# Patient Record
Sex: MALE | Race: ASIAN | Employment: FULL TIME | URBAN - METROPOLITAN AREA
[De-identification: names, ages, dates, MRNs, and addresses within clinical notes are randomized per-mention and may not be internally consistent; named-entity substitution may affect disease eponyms.]

---

## 2018-01-03 PROBLEM — K64.4 EXTERNAL HEMORRHOIDS WITHOUT COMPLICATION: Status: ACTIVE | Noted: 2018-01-03

## 2018-01-03 PROBLEM — E78.1 HYPERTRIGLYCERIDEMIA: Status: ACTIVE | Noted: 2018-01-03

## 2021-09-04 ENCOUNTER — APPOINTMENT (OUTPATIENT)
Dept: GENERAL RADIOLOGY | Age: 51
DRG: 554 | End: 2021-09-04
Attending: PHYSICIAN ASSISTANT
Payer: COMMERCIAL

## 2021-09-04 ENCOUNTER — HOSPITAL ENCOUNTER (INPATIENT)
Age: 51
LOS: 2 days | Discharge: HOME | DRG: 554 | End: 2021-09-07
Attending: EMERGENCY MEDICINE | Admitting: ORTHOPAEDIC SURGERY
Payer: COMMERCIAL

## 2021-09-04 DIAGNOSIS — M25.461 KNEE EFFUSION, RIGHT: ICD-10-CM

## 2021-09-04 DIAGNOSIS — M00.9 PYOGENIC ARTHRITIS OF RIGHT KNEE JOINT, DUE TO UNSPECIFIED ORGANISM (HCC): Primary | ICD-10-CM

## 2021-09-04 LAB
ALBUMIN SERPL-MCNC: 3.2 G/DL (ref 3.5–5)
ALBUMIN/GLOB SERPL: 0.9 {RATIO} (ref 1.2–3.5)
ALP SERPL-CCNC: 46 U/L (ref 50–136)
ALT SERPL-CCNC: 54 U/L (ref 12–65)
ANION GAP SERPL CALC-SCNC: 5 MMOL/L (ref 7–16)
AST SERPL-CCNC: 16 U/L (ref 15–37)
BASOPHILS # BLD: 0 K/UL (ref 0–0.2)
BASOPHILS NFR BLD: 0 % (ref 0–2)
BILIRUB SERPL-MCNC: 0.8 MG/DL (ref 0.2–1.1)
BUN SERPL-MCNC: 11 MG/DL (ref 6–23)
CALCIUM SERPL-MCNC: 8.3 MG/DL (ref 8.3–10.4)
CHLORIDE SERPL-SCNC: 103 MMOL/L (ref 98–107)
CO2 SERPL-SCNC: 30 MMOL/L (ref 21–32)
COVID-19 RAPID TEST, COVR: NOT DETECTED
CREAT SERPL-MCNC: 0.82 MG/DL (ref 0.8–1.5)
CRP SERPL-MCNC: 5.5 MG/DL (ref 0–0.9)
DIFFERENTIAL METHOD BLD: ABNORMAL
EOSINOPHIL # BLD: 0.1 K/UL (ref 0–0.8)
EOSINOPHIL NFR BLD: 1 % (ref 0.5–7.8)
ERYTHROCYTE [DISTWIDTH] IN BLOOD BY AUTOMATED COUNT: 12.5 % (ref 11.9–14.6)
GLOBULIN SER CALC-MCNC: 3.6 G/DL (ref 2.3–3.5)
GLUCOSE SERPL-MCNC: 143 MG/DL (ref 65–100)
HCT VFR BLD AUTO: 45 % (ref 41.1–50.3)
HGB BLD-MCNC: 15.5 G/DL (ref 13.6–17.2)
IMM GRANULOCYTES # BLD AUTO: 0.1 K/UL (ref 0–0.5)
IMM GRANULOCYTES NFR BLD AUTO: 1 % (ref 0–5)
LACTATE SERPL-SCNC: 1.3 MMOL/L (ref 0.4–2)
LYMPHOCYTES # BLD: 2.5 K/UL (ref 0.5–4.6)
LYMPHOCYTES NFR BLD: 18 % (ref 13–44)
MCH RBC QN AUTO: 32.2 PG (ref 26.1–32.9)
MCHC RBC AUTO-ENTMCNC: 34.4 G/DL (ref 31.4–35)
MCV RBC AUTO: 93.4 FL (ref 79.6–97.8)
MONOCYTES # BLD: 1.4 K/UL (ref 0.1–1.3)
MONOCYTES NFR BLD: 10 % (ref 4–12)
NEUTS SEG # BLD: 10.1 K/UL (ref 1.7–8.2)
NEUTS SEG NFR BLD: 71 % (ref 43–78)
NRBC # BLD: 0 K/UL (ref 0–0.2)
PLATELET # BLD AUTO: 212 K/UL (ref 150–450)
PMV BLD AUTO: 8.9 FL (ref 9.4–12.3)
POTASSIUM SERPL-SCNC: 3.6 MMOL/L (ref 3.5–5.1)
PROCALCITONIN SERPL-MCNC: <0.05 NG/ML
PROT SERPL-MCNC: 6.8 G/DL (ref 6.3–8.2)
RBC # BLD AUTO: 4.82 M/UL (ref 4.23–5.6)
SARS-COV-2, COV2: NORMAL
SODIUM SERPL-SCNC: 138 MMOL/L (ref 136–145)
SOURCE, COVRS: NORMAL
WBC # BLD AUTO: 14.2 K/UL (ref 4.3–11.1)

## 2021-09-04 PROCEDURE — 74011000250 HC RX REV CODE- 250: Performed by: EMERGENCY MEDICINE

## 2021-09-04 PROCEDURE — 86140 C-REACTIVE PROTEIN: CPT

## 2021-09-04 PROCEDURE — 74011250636 HC RX REV CODE- 250/636: Performed by: EMERGENCY MEDICINE

## 2021-09-04 PROCEDURE — 87635 SARS-COV-2 COVID-19 AMP PRB: CPT

## 2021-09-04 PROCEDURE — 89050 BODY FLUID CELL COUNT: CPT

## 2021-09-04 PROCEDURE — 87040 BLOOD CULTURE FOR BACTERIA: CPT

## 2021-09-04 PROCEDURE — 93005 ELECTROCARDIOGRAM TRACING: CPT | Performed by: PHYSICIAN ASSISTANT

## 2021-09-04 PROCEDURE — 96365 THER/PROPH/DIAG IV INF INIT: CPT

## 2021-09-04 PROCEDURE — U0005 INFEC AGEN DETEC AMPLI PROBE: HCPCS

## 2021-09-04 PROCEDURE — 96368 THER/DIAG CONCURRENT INF: CPT

## 2021-09-04 PROCEDURE — 80053 COMPREHEN METABOLIC PANEL: CPT

## 2021-09-04 PROCEDURE — 71045 X-RAY EXAM CHEST 1 VIEW: CPT

## 2021-09-04 PROCEDURE — 87070 CULTURE OTHR SPECIMN AEROBIC: CPT

## 2021-09-04 PROCEDURE — 84145 PROCALCITONIN (PCT): CPT

## 2021-09-04 PROCEDURE — 73562 X-RAY EXAM OF KNEE 3: CPT

## 2021-09-04 PROCEDURE — 82945 GLUCOSE OTHER FLUID: CPT

## 2021-09-04 PROCEDURE — 89060 EXAM SYNOVIAL FLUID CRYSTALS: CPT

## 2021-09-04 PROCEDURE — 83605 ASSAY OF LACTIC ACID: CPT

## 2021-09-04 PROCEDURE — 85025 COMPLETE CBC W/AUTO DIFF WBC: CPT

## 2021-09-04 PROCEDURE — 99284 EMERGENCY DEPT VISIT MOD MDM: CPT

## 2021-09-04 PROCEDURE — 81003 URINALYSIS AUTO W/O SCOPE: CPT

## 2021-09-04 PROCEDURE — 74011250637 HC RX REV CODE- 250/637: Performed by: PHYSICIAN ASSISTANT

## 2021-09-04 PROCEDURE — 96366 THER/PROPH/DIAG IV INF ADDON: CPT

## 2021-09-04 PROCEDURE — 96375 TX/PRO/DX INJ NEW DRUG ADDON: CPT

## 2021-09-04 PROCEDURE — 74011250636 HC RX REV CODE- 250/636: Performed by: PHYSICIAN ASSISTANT

## 2021-09-04 PROCEDURE — 84157 ASSAY OF PROTEIN OTHER: CPT

## 2021-09-04 PROCEDURE — 75810000054 HC ARTHOCENTISIS JOINT

## 2021-09-04 PROCEDURE — 74011000258 HC RX REV CODE- 258: Performed by: EMERGENCY MEDICINE

## 2021-09-04 RX ORDER — ACETAMINOPHEN 500 MG
1000 TABLET ORAL
Status: COMPLETED | OUTPATIENT
Start: 2021-09-04 | End: 2021-09-04

## 2021-09-04 RX ORDER — HYDROMORPHONE HYDROCHLORIDE 1 MG/ML
1 INJECTION, SOLUTION INTRAMUSCULAR; INTRAVENOUS; SUBCUTANEOUS ONCE
Status: COMPLETED | OUTPATIENT
Start: 2021-09-04 | End: 2021-09-05

## 2021-09-04 RX ORDER — HYDROMORPHONE HYDROCHLORIDE 1 MG/ML
1 INJECTION, SOLUTION INTRAMUSCULAR; INTRAVENOUS; SUBCUTANEOUS ONCE
Status: COMPLETED | OUTPATIENT
Start: 2021-09-04 | End: 2021-09-04

## 2021-09-04 RX ORDER — ONDANSETRON 2 MG/ML
4 INJECTION INTRAMUSCULAR; INTRAVENOUS
Status: COMPLETED | OUTPATIENT
Start: 2021-09-04 | End: 2021-09-04

## 2021-09-04 RX ORDER — LIDOCAINE HYDROCHLORIDE 10 MG/ML
10 INJECTION INFILTRATION; PERINEURAL
Status: COMPLETED | OUTPATIENT
Start: 2021-09-04 | End: 2021-09-04

## 2021-09-04 RX ORDER — ONDANSETRON 2 MG/ML
4 INJECTION INTRAMUSCULAR; INTRAVENOUS
Status: COMPLETED | OUTPATIENT
Start: 2021-09-04 | End: 2021-09-05

## 2021-09-04 RX ORDER — SODIUM CHLORIDE 0.9 % (FLUSH) 0.9 %
5-10 SYRINGE (ML) INJECTION AS NEEDED
Status: DISCONTINUED | OUTPATIENT
Start: 2021-09-04 | End: 2021-09-07 | Stop reason: HOSPADM

## 2021-09-04 RX ORDER — SODIUM CHLORIDE 0.9 % (FLUSH) 0.9 %
5-10 SYRINGE (ML) INJECTION EVERY 8 HOURS
Status: DISCONTINUED | OUTPATIENT
Start: 2021-09-04 | End: 2021-09-07 | Stop reason: HOSPADM

## 2021-09-04 RX ORDER — VANCOMYCIN 1.75 GRAM/500 ML IN 0.9 % SODIUM CHLORIDE INTRAVENOUS
1750 ONCE
Status: COMPLETED | OUTPATIENT
Start: 2021-09-04 | End: 2021-09-04

## 2021-09-04 RX ORDER — BUPIVACAINE HYDROCHLORIDE 5 MG/ML
2 INJECTION, SOLUTION EPIDURAL; INTRACAUDAL ONCE
Status: COMPLETED | OUTPATIENT
Start: 2021-09-04 | End: 2021-09-04

## 2021-09-04 RX ADMIN — HYDROMORPHONE HYDROCHLORIDE 1 MG: 1 INJECTION, SOLUTION INTRAMUSCULAR; INTRAVENOUS; SUBCUTANEOUS at 18:43

## 2021-09-04 RX ADMIN — SODIUM CHLORIDE, SODIUM LACTATE, POTASSIUM CHLORIDE, AND CALCIUM CHLORIDE 1000 ML: 600; 310; 30; 20 INJECTION, SOLUTION INTRAVENOUS at 18:44

## 2021-09-04 RX ADMIN — PIPERACILLIN AND TAZOBACTAM 4.5 G: 4; .5 INJECTION, POWDER, FOR SOLUTION INTRAVENOUS at 18:58

## 2021-09-04 RX ADMIN — LIDOCAINE HYDROCHLORIDE 10 ML: 10 INJECTION, SOLUTION INFILTRATION; PERINEURAL at 20:14

## 2021-09-04 RX ADMIN — ONDANSETRON 4 MG: 2 INJECTION INTRAMUSCULAR; INTRAVENOUS at 18:43

## 2021-09-04 RX ADMIN — ACETAMINOPHEN 1000 MG: 500 TABLET, FILM COATED ORAL at 18:43

## 2021-09-04 RX ADMIN — VANCOMYCIN HYDROCHLORIDE 1750 MG: 10 INJECTION, POWDER, LYOPHILIZED, FOR SOLUTION INTRAVENOUS at 20:00

## 2021-09-04 RX ADMIN — BUPIVACAINE HYDROCHLORIDE 10 MG: 5 INJECTION, SOLUTION EPIDURAL; INTRACAUDAL at 20:14

## 2021-09-04 NOTE — ED NOTES
Patient with swollen right red knee for two weeks progressively getting worse. Fever today in triage 102.9. No N/V. H/0 gout. +Tenderness to palpation over right knee with redness and swelling. Patient evaluated initially in triage. Rapid Medical Evaluation was conducted and necessary orders have been placed. I have performed a medical screening exam.  Care will now be transferred to the attending physician in the emergency department.   GARY Gongora 4:44 PM

## 2021-09-04 NOTE — ED TRIAGE NOTES
Here for \"gout\" in right knee. Hx of gout and right knee is swollen. Fever present 102.9 in triage. Masked. Denies COVID exposure.

## 2021-09-05 PROBLEM — M25.461 KNEE EFFUSION, RIGHT: Status: ACTIVE | Noted: 2021-09-05

## 2021-09-05 LAB
APPEARANCE FLD: NORMAL
ATRIAL RATE: 98 BPM
CALCULATED P AXIS, ECG09: 2 DEGREES
CALCULATED R AXIS, ECG10: 8 DEGREES
CALCULATED T AXIS, ECG11: -39 DEGREES
COLOR FLD: YELLOW
DIAGNOSIS, 93000: NORMAL
GLUCOSE FLD-MCNC: 3 MG/DL
LYMPHOCYTES NFR BRONCH MANUAL: 1 %
NEUTROPHILS NFR BRONCH MANUAL: 99 %
NUC CELL # FLD: NORMAL /CU MM
P-R INTERVAL, ECG05: 156 MS
PROT FLD-MCNC: 2.9 G/DL
Q-T INTERVAL, ECG07: 322 MS
QRS DURATION, ECG06: 84 MS
QTC CALCULATION (BEZET), ECG08: 411 MS
RBC # FLD: 1000 /CU MM
SPECIMEN SOURCE FLD: NORMAL
VENTRICULAR RATE, ECG03: 98 BPM

## 2021-09-05 PROCEDURE — 99233 SBSQ HOSP IP/OBS HIGH 50: CPT | Performed by: PHYSICIAN ASSISTANT

## 2021-09-05 PROCEDURE — 65270000029 HC RM PRIVATE

## 2021-09-05 PROCEDURE — 74011000258 HC RX REV CODE- 258: Performed by: HOSPITALIST

## 2021-09-05 PROCEDURE — 2709999900 HC NON-CHARGEABLE SUPPLY

## 2021-09-05 PROCEDURE — 74011250636 HC RX REV CODE- 250/636: Performed by: EMERGENCY MEDICINE

## 2021-09-05 PROCEDURE — 77030040361 HC SLV COMPR DVT MDII -B

## 2021-09-05 PROCEDURE — 74011250636 HC RX REV CODE- 250/636: Performed by: PHYSICIAN ASSISTANT

## 2021-09-05 PROCEDURE — 74011250637 HC RX REV CODE- 250/637: Performed by: HOSPITALIST

## 2021-09-05 PROCEDURE — 74011250636 HC RX REV CODE- 250/636: Performed by: HOSPITALIST

## 2021-09-05 PROCEDURE — 96376 TX/PRO/DX INJ SAME DRUG ADON: CPT

## 2021-09-05 PROCEDURE — 74011250637 HC RX REV CODE- 250/637: Performed by: PHYSICIAN ASSISTANT

## 2021-09-05 RX ORDER — HYDROMORPHONE HYDROCHLORIDE 1 MG/ML
1 INJECTION, SOLUTION INTRAMUSCULAR; INTRAVENOUS; SUBCUTANEOUS
Status: DISCONTINUED | OUTPATIENT
Start: 2021-09-05 | End: 2021-09-07 | Stop reason: HOSPADM

## 2021-09-05 RX ORDER — OXYCODONE HYDROCHLORIDE 5 MG/1
5-10 TABLET ORAL
Status: DISCONTINUED | OUTPATIENT
Start: 2021-09-05 | End: 2021-09-07

## 2021-09-05 RX ORDER — ASPIRIN 81 MG/1
81 TABLET ORAL EVERY 12 HOURS
Status: DISCONTINUED | OUTPATIENT
Start: 2021-09-05 | End: 2021-09-07 | Stop reason: HOSPADM

## 2021-09-05 RX ORDER — COLCHICINE 0.6 MG/1
1.2 TABLET ORAL ONCE
Status: COMPLETED | OUTPATIENT
Start: 2021-09-05 | End: 2021-09-05

## 2021-09-05 RX ORDER — ACETAMINOPHEN 500 MG
1000 TABLET ORAL
Status: DISCONTINUED | OUTPATIENT
Start: 2021-09-06 | End: 2021-09-05

## 2021-09-05 RX ORDER — AMLODIPINE BESYLATE 5 MG/1
5 TABLET ORAL DAILY
Status: DISCONTINUED | OUTPATIENT
Start: 2021-09-05 | End: 2021-09-07 | Stop reason: HOSPADM

## 2021-09-05 RX ORDER — NALOXONE HYDROCHLORIDE 0.4 MG/ML
.2-.4 INJECTION, SOLUTION INTRAMUSCULAR; INTRAVENOUS; SUBCUTANEOUS
Status: DISCONTINUED | OUTPATIENT
Start: 2021-09-05 | End: 2021-09-07 | Stop reason: HOSPADM

## 2021-09-05 RX ORDER — ACETAMINOPHEN 500 MG
1000 TABLET ORAL
Status: DISCONTINUED | OUTPATIENT
Start: 2021-09-05 | End: 2021-09-07 | Stop reason: ALTCHOICE

## 2021-09-05 RX ORDER — SODIUM CHLORIDE 9 MG/ML
100 INJECTION, SOLUTION INTRAVENOUS CONTINUOUS
Status: DISPENSED | OUTPATIENT
Start: 2021-09-05 | End: 2021-09-06

## 2021-09-05 RX ORDER — HYDRALAZINE HYDROCHLORIDE 50 MG/1
50 TABLET, FILM COATED ORAL
Status: DISCONTINUED | OUTPATIENT
Start: 2021-09-05 | End: 2021-09-07 | Stop reason: HOSPADM

## 2021-09-05 RX ORDER — VANCOMYCIN HYDROCHLORIDE
1250 EVERY 12 HOURS
Status: DISCONTINUED | OUTPATIENT
Start: 2021-09-05 | End: 2021-09-06 | Stop reason: DRUGHIGH

## 2021-09-05 RX ORDER — DIPHENHYDRAMINE HCL 25 MG
25 CAPSULE ORAL
Status: DISCONTINUED | OUTPATIENT
Start: 2021-09-05 | End: 2021-09-07 | Stop reason: HOSPADM

## 2021-09-05 RX ORDER — SODIUM CHLORIDE 0.9 % (FLUSH) 0.9 %
5-40 SYRINGE (ML) INJECTION AS NEEDED
Status: DISCONTINUED | OUTPATIENT
Start: 2021-09-05 | End: 2021-09-07 | Stop reason: HOSPADM

## 2021-09-05 RX ORDER — SODIUM CHLORIDE 0.9 % (FLUSH) 0.9 %
5-40 SYRINGE (ML) INJECTION EVERY 8 HOURS
Status: DISCONTINUED | OUTPATIENT
Start: 2021-09-05 | End: 2021-09-07 | Stop reason: HOSPADM

## 2021-09-05 RX ORDER — ONDANSETRON 8 MG/1
8 TABLET, ORALLY DISINTEGRATING ORAL
Status: DISCONTINUED | OUTPATIENT
Start: 2021-09-05 | End: 2021-09-07 | Stop reason: HOSPADM

## 2021-09-05 RX ORDER — AMOXICILLIN 250 MG
2 CAPSULE ORAL DAILY
Status: DISCONTINUED | OUTPATIENT
Start: 2021-09-06 | End: 2021-09-07 | Stop reason: HOSPADM

## 2021-09-05 RX ORDER — PROMETHAZINE HYDROCHLORIDE 25 MG/1
25 TABLET ORAL
Status: DISCONTINUED | OUTPATIENT
Start: 2021-09-05 | End: 2021-09-07 | Stop reason: HOSPADM

## 2021-09-05 RX ORDER — COLCHICINE 0.6 MG/1
0.6 TABLET ORAL DAILY
Status: DISCONTINUED | OUTPATIENT
Start: 2021-09-05 | End: 2021-09-07 | Stop reason: HOSPADM

## 2021-09-05 RX ADMIN — OXYCODONE 10 MG: 5 TABLET ORAL at 09:21

## 2021-09-05 RX ADMIN — COLCHICINE 1.2 MG: 0.6 TABLET, FILM COATED ORAL at 13:58

## 2021-09-05 RX ADMIN — Medication 10 ML: at 01:46

## 2021-09-05 RX ADMIN — OXYCODONE 10 MG: 5 TABLET ORAL at 22:46

## 2021-09-05 RX ADMIN — VANCOMYCIN HYDROCHLORIDE 1250 MG: 10 INJECTION, POWDER, LYOPHILIZED, FOR SOLUTION INTRAVENOUS at 15:37

## 2021-09-05 RX ADMIN — HYDRALAZINE HYDROCHLORIDE 50 MG: 50 TABLET, FILM COATED ORAL at 13:08

## 2021-09-05 RX ADMIN — COLCHICINE 0.6 MG: 0.6 TABLET, FILM COATED ORAL at 15:41

## 2021-09-05 RX ADMIN — HYDROMORPHONE HYDROCHLORIDE 1 MG: 1 INJECTION, SOLUTION INTRAMUSCULAR; INTRAVENOUS; SUBCUTANEOUS at 02:03

## 2021-09-05 RX ADMIN — ONDANSETRON 4 MG: 2 INJECTION INTRAMUSCULAR; INTRAVENOUS at 00:29

## 2021-09-05 RX ADMIN — CEFTRIAXONE SODIUM 2 G: 2 INJECTION, POWDER, FOR SOLUTION INTRAMUSCULAR; INTRAVENOUS at 13:59

## 2021-09-05 RX ADMIN — Medication 10 ML: at 23:10

## 2021-09-05 RX ADMIN — SODIUM CHLORIDE 100 ML/HR: 900 INJECTION, SOLUTION INTRAVENOUS at 01:46

## 2021-09-05 RX ADMIN — SODIUM CHLORIDE 100 ML/HR: 900 INJECTION, SOLUTION INTRAVENOUS at 12:11

## 2021-09-05 RX ADMIN — HYDROMORPHONE HYDROCHLORIDE 1 MG: 1 INJECTION, SOLUTION INTRAMUSCULAR; INTRAVENOUS; SUBCUTANEOUS at 00:29

## 2021-09-05 RX ADMIN — ACETAMINOPHEN 1000 MG: 500 TABLET, FILM COATED ORAL at 13:08

## 2021-09-05 RX ADMIN — ACETAMINOPHEN 1000 MG: 500 TABLET, FILM COATED ORAL at 22:48

## 2021-09-05 RX ADMIN — Medication 81 MG: at 22:46

## 2021-09-05 RX ADMIN — OXYCODONE 10 MG: 5 TABLET ORAL at 13:13

## 2021-09-05 RX ADMIN — HYDROMORPHONE HYDROCHLORIDE 1 MG: 1 INJECTION, SOLUTION INTRAMUSCULAR; INTRAVENOUS; SUBCUTANEOUS at 05:12

## 2021-09-05 RX ADMIN — HYDROMORPHONE HYDROCHLORIDE 1 MG: 1 INJECTION, SOLUTION INTRAMUSCULAR; INTRAVENOUS; SUBCUTANEOUS at 19:37

## 2021-09-05 RX ADMIN — AMLODIPINE BESYLATE 5 MG: 5 TABLET ORAL at 09:20

## 2021-09-05 NOTE — ED NOTES
TRANSFER - OUT REPORT:    Verbal report given to BLAYNE Avila on Mark Gutiérrez  being transferred to Room: 328 for routine progression of care       Report consisted of patients Situation, Background, Assessment and   Recommendations(SBAR). Information from the following report(s) SBAR was reviewed with the receiving nurse. Lines:   Peripheral IV 09/04/21 Right Antecubital (Active)   Site Assessment Clean, dry, & intact 09/04/21 1650   Phlebitis Assessment 0 09/04/21 1650   Infiltration Assessment 0 09/04/21 1650   Dressing Status Clean, dry, & intact 09/04/21 1650   Dressing Type Gauze;Tape;Transparent 09/04/21 1650   Hub Color/Line Status Pink;Flushed 09/04/21 1650   Action Taken Blood drawn 09/04/21 1650        Opportunity for questions and clarification was provided.       Patient transported with:   Registered Nurse

## 2021-09-05 NOTE — CONSULTS
[facility] Hospitalist Consult   Admit Date:  2021  6:04 PM   Name:  Basil Bustillo   Age:  49 y.o. Sex:  male  :  1970   MRN:  [id number]     Presenting Complaint: Fever and Knee Pain    Reason(s) for Admission: Knee effusion, right [M25.461]     Hospitalists consulted by Lashanda Marcos MD for: Hypertension    History of Presenting Illness:   Basil Bustillo is a 49 y.o. male with history of hypertension who was admitted for right knee swelling. Patient has right knee swelling that has been progressively worsening over the past week. Fever on admission. He had arthrocentesis done on admission. Started on  vancomycin pending culture results. Orthopedics primary. Hospitalist consulted for management of hypertension. This morning patient states that he has worsening pain in his right knee with swelling. Pain in his right knee 10/10 in severity, sharp, restricted range of motion due to pain. No alleviating factors. Aggravated by movement. No injury to the right knee. He reports he had gouty arthritis in his right knee about 10 years ago. No chest pain no palpitations. No cough no shortness of breath. No nausea no vomiting. Review of Systems:  10 systems reviewed and negative except as noted in HPI. Assessment & Plan: Active Problems:      Knee effusion, right (2021)  ? Gouty arthritis versus septic arthritis. Per primary orthopedics. Continue vancomycin. Add ceftriaxone. Add colchicine. Await culture results from the right knee. Hypertension  Add amlodipine 5 mg p.o. daily. Hydralazine as needed. DVT prophylaxis SCDs  GI prophylaxis none    Disposition per primary orthopedics.     Active Hospital Problems    Diagnosis Date Noted    Knee effusion, right 2021       Past Medical History:   Diagnosis Date    Excessive daytime sleepiness 12/15/2016    Hemorrhoid     Hypercholesterolemia     Hypertension     Morbid obesity ([address]     Snoring 12/15/2016      No past surgical history on file. No Known Allergies   Social History     Tobacco Use    Smoking status: Former Smoker    Smokeless tobacco: Never Used   Substance Use Topics    Alcohol use: Yes     Comment: occasional drinker      Family History   Problem Relation Age of Onset    Hypertension Mother     Sleep Apnea Other     Gout Father       Family history reviewed and negative unless otherwise noted above. There is no immunization history on file for this patient. Objective:     Patient Vitals for the past 24 hrs:   Temp Pulse Resp BP SpO2   09/05/21 0923 99.3 °F (37.4 °C)       09/05/21 0813 99 °F (37.2 °C) 100 18 (!) 162/91 93 %   09/05/21 0130 99.9 °F (37.7 °C) 99 18 (!) 179/99 95 %   09/04/21 2200  86 16 (!) 159/96 97 %   09/04/21 2051  97 15 (!) 164/92 97 %   09/04/21 2050  94 24 (!) 164/92 95 %   09/04/21 2021 99.5 °F (37.5 °C)       09/04/21 1642 (!) 102.9 °F (39.4 °C) (!) 112 20 (!) 179/101 95 %     Oxygen Therapy  O2 Sat (%): 93 % (09/05/21 0813)  Pulse via Oximetry: 88 beats per minute (09/04/21 2200)  O2 Device: None (Room air) (09/04/21 2050)    Estimated body mass index is 25.63 kg/m² as calculated from the following:    Height as of this encounter: 5' 5\" (1.651 m). Weight as of this encounter: 69.9 kg (154 lb). Intake/Output Summary (Last 24 hours) at 9/5/2021 1300  Last data filed at 9/4/2021 2205  Gross per 24 hour   Intake 1600 ml   Output    Net 1600 ml         Physical Exam:  General:    Well nourished. No overt distress  Head:  Normocephalic, atraumatic  Eyes:  Sclerae appear normal.  Pupils equally round. ENT:  Nares appear normal, no drainage. Moist oral mucosa  Neck:  No restricted ROM. Trachea midline   CV:   RRR. No m/r/g. No jugular venous distension. Lungs:   CTAB. No wheezing, rhonchi, or rales. Respirations even, unlabored  Abdomen: Bowel sounds present. Soft, nontender, nondistended. Extremities: No cyanosis or clubbing.  Swelling, right knee, decreased range of motion, tenderness,  Skin:     No rashes and normal coloration. Warm and dry. Neuro:  Cranial nerves II-XII grossly intact. Sensation intact  Psych:  Normal mood and affect. Alert and oriented x3    I have reviewed ordered lab tests and independently visualized imaging below:    Last 24hr Labs:  Recent Results (from the past 24 hour(s))   CULTURE, BLOOD    Collection Time: 09/04/21  4:53 PM    Specimen: Blood   Result Value Ref Range    Special Requests: RIGHT  FOREARM        Culture result: NO GROWTH AFTER 13 HOURS     LACTIC ACID    Collection Time: 09/04/21  4:53 PM   Result Value Ref Range    Lactic acid 1.3 0.4 - 2.0 MMOL/L   CBC WITH AUTOMATED DIFF    Collection Time: 09/04/21  4:53 PM   Result Value Ref Range    WBC 14.2 (H) 4.3 - 11.1 K/uL    RBC 4.82 4.23 - 5.6 M/uL    HGB 15.5 13.6 - 17.2 g/dL    HCT 45.0 41.1 - 50.3 %    MCV 93.4 79.6 - 97.8 FL    MCH 32.2 26.1 - 32.9 PG    MCHC 34.4 31.4 - 35.0 g/dL    RDW 12.5 11.9 - 14.6 %    PLATELET 657 697 - 120 K/uL    MPV 8.9 (L) 9.4 - 12.3 FL    ABSOLUTE NRBC 0.00 0.0 - 0.2 K/uL    DF AUTOMATED      NEUTROPHILS 71 43 - 78 %    LYMPHOCYTES 18 13 - 44 %    MONOCYTES 10 4.0 - 12.0 %    EOSINOPHILS 1 0.5 - 7.8 %    BASOPHILS 0 0.0 - 2.0 %    IMMATURE GRANULOCYTES 1 0.0 - 5.0 %    ABS. NEUTROPHILS 10.1 (H) 1.7 - 8.2 K/UL    ABS. LYMPHOCYTES 2.5 0.5 - 4.6 K/UL    ABS. MONOCYTES 1.4 (H) 0.1 - 1.3 K/UL    ABS. EOSINOPHILS 0.1 0.0 - 0.8 K/UL    ABS. BASOPHILS 0.0 0.0 - 0.2 K/UL    ABS. IMM.  GRANS. 0.1 0.0 - 0.5 K/UL   METABOLIC PANEL, COMPREHENSIVE    Collection Time: 09/04/21  4:53 PM   Result Value Ref Range    Sodium 138 136 - 145 mmol/L    Potassium 3.6 3.5 - 5.1 mmol/L    Chloride 103 98 - 107 mmol/L    CO2 30 21 - 32 mmol/L    Anion gap 5 (L) 7 - 16 mmol/L    Glucose 143 (H) 65 - 100 mg/dL    BUN 11 6 - 23 MG/DL    Creatinine 0.82 0.8 - 1.5 MG/DL    GFR est AA >60 >60 ml/min/1.73m2    GFR est non-AA >60 >60 ml/min/1.73m2    Calcium 8.3 8.3 - 10.4 MG/DL    Bilirubin, total 0.8 0.2 - 1.1 MG/DL    ALT (SGPT) 54 12 - 65 U/L    AST (SGOT) 16 15 - 37 U/L    Alk. phosphatase 46 (L) 50 - 136 U/L    Protein, total 6.8 6.3 - 8.2 g/dL    Albumin 3.2 (L) 3.5 - 5.0 g/dL    Globulin 3.6 (H) 2.3 - 3.5 g/dL    A-G Ratio 0.9 (L) 1.2 - 3.5     PROCALCITONIN    Collection Time: 09/04/21  4:53 PM   Result Value Ref Range    Procalcitonin <0.05 ng/mL   C REACTIVE PROTEIN, QT    Collection Time: 09/04/21  4:53 PM   Result Value Ref Range    C-Reactive protein 5.5 (H) 0.0 - 0.9 mg/dL   EKG, 12 LEAD, INITIAL    Collection Time: 09/04/21  4:58 PM   Result Value Ref Range    Ventricular Rate 98 BPM    Atrial Rate 98 BPM    P-R Interval 156 ms    QRS Duration 84 ms    Q-T Interval 322 ms    QTC Calculation (Bezet) 411 ms    Calculated P Axis 2 degrees    Calculated R Axis 8 degrees    Calculated T Axis -39 degrees    Diagnosis       Normal sinus rhythm  T wave abnormality, consider inferolateral ischemia  Abnormal ECG  No previous ECGs available     CULTURE, BLOOD    Collection Time: 09/04/21  6:51 PM    Specimen: Blood   Result Value Ref Range    Special Requests: LEFT  Antecubital        Culture result: NO GROWTH AFTER 11 HOURS     COVID-19 RAPID TEST    Collection Time: 09/04/21  6:54 PM   Result Value Ref Range    Specimen source Nasopharyngeal      COVID-19 rapid test Not detected NOTD     SARS-COV-2    Collection Time: 09/04/21  6:54 PM   Result Value Ref Range    SARS-CoV-2 Please find results under separate order     PROTEIN TOTAL, FLUID    Collection Time: 09/04/21  6:55 PM   Result Value Ref Range    Fluid Type: SYNOVIAL FLUID      Protein total, body fld. 2.9 g/dL   CRYSTALS, SYNOVIAL FLUID    Collection Time: 09/04/21  6:55 PM   Result Value Ref Range    FLUID TYPE(7) SYNOVIAL FLUID      Crystals, body fluid PENDING    GLUCOSE, FLUID    Collection Time: 09/04/21  6:55 PM   Result Value Ref Range    Fluid Type: SYNOVIAL FLUID      Glucose, body fld.  3 MG/DL   CELL COUNT, BODY FLUID    Collection Time: 09/04/21  6:55 PM   Result Value Ref Range    BODY FLUID TYPE SYNOVIAL FLUID      FLUID COLOR YELLOW      FLUID APPEARANCE TURBID      FLUID RBC CT. 1,000 /cu mm    FLUID WBC COUNT 28,959 /cu mm    BRCH NEUTROPHIL 99 %    BRCH LYMPHS 1 %   CULTURE, BODY FLUID W GRAM STAIN    Collection Time: 09/04/21  6:55 PM    Specimen: Synovial Fluid    RIGHT   Result Value Ref Range    Special Requests: SYNOVIAL FLUID      GRAM STAIN PENDING     Culture result:        NO GROWTH AFTER SHORT PERIOD OF INCUBATION. FURTHER RESULTS TO FOLLOW AFTER OVERNIGHT INCUBATION. All Micro Results     Procedure Component Value Units Date/Time    CULTURE, BODY FLUID Marge Little STAIN [[id number]] Collected: 09/04/21 1855    Order Status: Completed Specimen: Synovial Fluid Updated: 09/05/21 0708     Special Requests: SYNOVIAL FLUID        GRAM STAIN PENDING     Culture result:       NO GROWTH AFTER SHORT PERIOD OF INCUBATION. FURTHER RESULTS TO FOLLOW AFTER OVERNIGHT INCUBATION. BLOOD CULTURE [[id number]] Collected: 09/04/21 1653    Order Status: Completed Specimen: Blood Updated: 09/05/21 0649     Special Requests: --        RIGHT  FOREARM       Culture result: NO GROWTH AFTER 13 HOURS       BLOOD CULTURE [[id number]] Collected: 09/04/21 1851    Order Status: Completed Specimen: Blood Updated: 09/05/21 0649     Special Requests: --        LEFT  Antecubital       Culture result: NO GROWTH AFTER 11 HOURS       COVID-19 RAPID TEST [[id number]] Collected: 09/04/21 1854    Order Status: Completed Specimen: Nasopharyngeal Updated: 09/04/21 1927     Specimen source Nasopharyngeal        COVID-19 rapid test Not detected        Comment:      The specimen is NEGATIVE for SARS-CoV-2, the novel coronavirus associated with COVID-19. A negative result does not rule out COVID-19. This test has been authorized by the FDA under an Emergency Use Authorization (EUA) for use by authorized laboratories.         Fact sheet for Healthcare Providers: kstattoo.com  Fact sheet for Patients: kstattoo.com       Methodology: Isothermal Nucleic Acid Amplification               Other Studies:  XR CHEST PORT    Result Date: 9/4/2021  Chest X-ray INDICATION: Fever A portable AP view of the chest was obtained. FINDINGS: The lungs are clear. There are no infiltrates or effusions. The heart size is normal.  The bony thorax is intact. No acute findings in the chest     XR KNEE RT 3 V    Result Date: 9/4/2021  Right Knee INDICATION: Pain and fever, history of gout Three views of the right knee were obtained FINDINGS:  There is a bipartite patella. There is no fracture or bone erosion. There is a large joint effusion.      Large joint effusion       Current Meds:  Current Facility-Administered Medications   Medication Dose Route Frequency    0.9% sodium chloride infusion  100 mL/hr IntraVENous CONTINUOUS    sodium chloride (NS) flush 5-40 mL  5-40 mL IntraVENous Q8H    sodium chloride (NS) flush 5-40 mL  5-40 mL IntraVENous PRN    oxyCODONE IR (ROXICODONE) tablet 5-10 mg  5-10 mg Oral Q4H PRN    HYDROmorphone (DILAUDID) injection 1 mg  1 mg IntraVENous Q3H PRN    naloxone (NARCAN) injection 0.2-0.4 mg  0.2-0.4 mg IntraVENous Q10MIN PRN    promethazine (PHENERGAN) tablet 25 mg  25 mg Oral Q6H PRN    diphenhydrAMINE (BENADRYL) capsule 25 mg  25 mg Oral Q4H PRN    [START ON 9/6/2021] senna-docusate (PERICOLACE) 8.6-50 mg per tablet 2 Tablet  2 Tablet Oral DAILY    aspirin delayed-release tablet 81 mg  81 mg Oral Q12H    ondansetron (ZOFRAN ODT) tablet 8 mg  8 mg Oral Q8H PRN    [START ON 9/6/2021] acetaminophen (TYLENOL) tablet 1,000 mg  1,000 mg Oral Q6H PRN    hydrALAZINE (APRESOLINE) tablet 50 mg  50 mg Oral Q6H PRN    amLODIPine (NORVASC) tablet 5 mg  5 mg Oral DAILY    sodium chloride (NS) flush 5-10 mL  5-10 mL IntraVENous Q8H    sodium chloride (NS) flush 5-10 mL 5-10 mL IntraVENous PRN       Signed:  Jarod Rader MD    Part of this note may have been written by using a voice dictation software. The note has been proof read but may still contain some grammatical/other typographical errors.

## 2021-09-05 NOTE — ED PROVIDER NOTES
Chief complaint : Knee pain    HISTORY OF PRESENT ILLNESS :  Location : Right    Quality : Tender, red, warm, swollen    Quantity : Constant and steadily worsening    Timing : 2 weeks, only spiked a fever today    Severity : Severe    Context : History of gout, in that knee    Alleviating / exacerbating factors : Hurts to bend or straighten    Associated Symptoms : Fever             Past Medical History:   Diagnosis Date    Excessive daytime sleepiness 12/15/2016    Hemorrhoid     Hypercholesterolemia     Hypertension     Morbid obesity ([address]     Snoring 12/15/2016       No past surgical history on file. Family History:   Problem Relation Age of Onset    Hypertension Mother     Sleep Apnea Other     Gout Father        Social History     Socioeconomic History    Marital status:      Spouse name: Not on file    Number of children: Not on file    Years of education: Not on file    Highest education level: Not on file   Occupational History    Not on file   Tobacco Use    Smoking status: Former Smoker    Smokeless tobacco: Never Used   Substance and Sexual Activity    Alcohol use: Yes     Comment: occasional drinker    Drug use: No    Sexual activity: Not on file   Other Topics Concern    Not on file   Social History Narrative    Not on file     Social Determinants of Health     Financial Resource Strain:     Difficulty of Paying Living Expenses:    Food Insecurity:     Worried About [address] [facility] in the Last Year:     [address] in the Last Year:    Transportation Needs:     Lack of Transportation (Medical):      Lack of Transportation (Non-Medical):    Physical Activity:     Days of Exercise per Week:     Minutes of Exercise per Session:    Stress:     Feeling of Stress :    Social Connections:     Frequency of Communication with Friends and Family:     Frequency of Social Gatherings with Friends and Family:     Attends Orthodox Services:     Active Member of Clubs or Organizations:     Attends Club or Organization Meetings:     Marital Status:    Intimate Partner Violence:     Fear of Current or Ex-Partner:     Emotionally Abused:     Physically Abused:     Sexually Abused: ALLERGIES: Patient has no known allergies. Review of Systems   Musculoskeletal: Positive for arthralgias and joint swelling. Skin: Positive for color change and rash. Neurological: Negative for seizures and weakness. All other systems reviewed and are negative. Vitals:    09/04/21 1642   BP: (!) 179/101   Pulse: (!) 112   Resp: 20   Temp: (!) 102.9 °F (39.4 °C)   SpO2: 95%   Weight: 69.9 kg (154 lb)   Height: 5' 5\" (1.651 m)            Physical Exam  Vitals and nursing note reviewed. Constitutional:       General: He is not in acute distress. Appearance: Normal appearance. He is well-developed. He is not ill-appearing, toxic-appearing or diaphoretic. Comments: Febrile     HENT:      Head: Normocephalic and atraumatic. Right Ear: External ear normal.      Left Ear: External ear normal.   Eyes:      General: No scleral icterus. Right eye: No discharge. Left eye: No discharge. Extraocular Movements: Extraocular movements intact. Conjunctiva/sclera: Conjunctivae normal.      Pupils: Pupils are equal, round, and reactive to light. Cardiovascular:      Rate and Rhythm: Normal rate and regular rhythm. Pulmonary:      Effort: Pulmonary effort is normal. No respiratory distress. Breath sounds: No wheezing or rales. Abdominal:      General: Abdomen is flat. Tenderness: There is no abdominal tenderness. There is no guarding or rebound. Musculoskeletal:         General: Swelling and tenderness present. Cervical back: Normal range of motion and neck supple. Right knee: Swelling and effusion present. Decreased range of motion. Tenderness present. Legs:    Skin:     General: Skin is warm and dry.       Findings: No rash. Neurological:      General: No focal deficit present. Mental Status: He is alert and oriented to person, place, and time. Mental status is at baseline. Motor: No abnormal muscle tone. Comments: cni 2-12 grossly  Nl gait,  Nl speech     Psychiatric:         Mood and Affect: Mood normal.         Behavior: Behavior normal.          MDM  Number of Diagnoses or Management Options  Pyogenic arthritis of right knee joint, due to unspecified organism [facility])  Diagnosis management comments: Medical decision making note:  Swollen red hot right knee, suspect septic arthritis, small window with normal colored skin to permit an arthrocentesis, fluid is cloudy labs pending. Going to start broad-spectrum antibiotics while awaiting fluid results  This concludes the \"medical decision making note\" part of this emergency department visit note. Amount and/or Complexity of Data Reviewed  Clinical lab tests: reviewed and ordered  Tests in the radiology section of CPT®: reviewed and ordered (XR CHEST PORT   Final Result    No acute findings in the chest         XR KNEE RT 3 V   Final Result    Large joint effusion         )  Decide to obtain previous medical records or to obtain history from someone other than the patient: yes    Risk of Complications, Morbidity, and/or Mortality  Presenting problems: moderate  Diagnostic procedures: low  Management options: low    Patient Progress  Patient progress: improved    ED Course as of Sep 05 0056   Sun Sep 05, 2021   0024 +++    [JS]   0035 Discussed the case with the orthopedics physician assistant on-call. Given the patient has no other real significant medical history he will be admitted to the orthopedic service for evaluation in the morning and likely washout. Patient is already been given fluids and antibiotics.     [JS]      ED Course User Index  [JS] Jatinder Kuo MD       Arthrocentesis    Date/Time: 9/4/2021 9:12 PM  Performed by: Michell Hawk MD  Authorized by: Maude Villagomez MD     Consent:     Consent obtained:  Written    Consent given by:  Patient    Risks discussed:  Bleeding and infection    Alternatives discussed:  No treatment  Location:     Location:  Knee  Anesthesia (see MAR for exact dosages): Anesthesia method:  Local infiltration    Local anesthetic:  Lidocaine 1% w/o epi  Procedure details:     Preparation: Patient was prepped and draped in usual sterile fashion      Needle gauge:  18 G    Ultrasound guidance: no      Approach:  Lateral    Aspirate amount:  84    Aspirate characteristics:  Cloudy    Steroid injected: no (bupivicaine injected)    Post-procedure details:     Dressing:  Adhesive bandage    Patient tolerance of procedure:   Tolerated well, no immediate complications

## 2021-09-05 NOTE — PROGRESS NOTES
[facility] OP PROGRESS NOTE    2021  Admit Date: 2021  Admit Diagnosis: Knee effusion, right [M25.461]      Subjective:     Arik Perdomo is a patient who was admitted for acutely swollen knee. synovial fluid sent for analysis and gram stain. .       Objective:     Vital Signs:    Blood pressure (!) 160/92, pulse 100, temperature (!) 100.8 °F (38.2 °C), resp. rate 20, height 5' 5\" (1.651 m), weight 154 lb (69.9 kg), SpO2 96 %. Temp (24hrs), Av.2 °F (37.9 °C), Min:99 °F (37.2 °C), Max:102.9 °F (39.4 °C)      No intake/output data recorded.  1901 -  0700  In: 1600 [I.V.:1600]  Out: -     LAB:    Recent Labs     21  1653   HGB 15.5   WBC 14.2*          Physical Exam    General:   Alert and oriented. No acute distress  Lungs:  Respirations unlabored. Extremities: No evidence of cyanosis. Calves soft, nontender. Moves both upper and lower extremities. Left knee warm to touch with effusion. Neuro:  no deficit    Results for Jeremie Font (MRN [id number]) as of 2021 14:05   Ref. Range 2021 18:55   BODY FLUID TYPE Latest Units:   SYNOVIAL FLUID   FLUID APPEARANCE Latest Units:   TURBID   FLUID COLOR Latest Units:   YELLOW   FLUID RBC CT. Latest Units: /cu mm 1,000   FLUID WBC COUNT Latest Units: /cu mm 28,959   FLUID TYPE(7) Latest Units:   SYNOVIAL FLUID   Crystals, body fluid Unknown PENDING   BRCH NEUTROPHIL Latest Units: % 99   BRCH LYMPHS Latest Units: % 1   Fluid Type: Latest Units:   SYNOVIAL FLUID   Protein total, body fld. Latest Units: g/dL 2.9   Fluid Type: Latest Units:   SYNOVIAL FLUID   Glucose, body fld. Latest Units: MG/DL 3     Special Requests:   SYNOVIAL FLUID P  [address] [place] [address]   GRAM STAIN  PENDING P     Culture result:   NO GROWTH AFTER SHORT PERIOD OF INCUBATION. FURTHER RESULTS TO FOLLOW AFTER OVERNIGHT INCUBATION.  [facility]         Specimen Collected: 21 18:55 Last Resulted: 21 07:08 Assessment:      Patient Active Problem List   Diagnosis Code    Snoring R06.83    BMI 29.0-29.9,adult Z68.29    Hypertriglyceridemia E78.1    External hemorrhoids without complication W42.4    Knee effusion, right M25.461       Plan:     Discussed case with Dr. Crispin Wang; differential is septic joint vs inflammatory I.e. gout. He has history of gout. So far no growth of cultures. He is on emperic Vanc and hospitalist will add Rocephin. We will begin Colchicine and let him eat today. NPO tonight. Will recheck cultures and gram stain and crystal status tomorrow. I have discussed his case with Dr. Nelson Rai as well.      Anticipate Discharge To: HOME  Pending further treatment plan     Signed By: Nish Wilder PA-C

## 2021-09-05 NOTE — PROGRESS NOTES
Ortho:    Patient with large right knee effusion, progressive pain and fever of 102.9. Does have a history of gout. Will admit due to fever and these other symptoms. Will await final aspirate results and culture. Will keep NPO for now in case surgical intervention is necessary. Will continue to monitor. Melita Anderson PA-C    Discussed with Dr. Eufemia Barrera and he agrees.

## 2021-09-05 NOTE — PROGRESS NOTES
Admission assessment complete. Patient is alert and oriented, in bed. Oriented to room and call light functions. Respirations are even and unlabored. Patient is on room air. Bowel sounds are present. Patient's right knee is red and hot to touch. Edema noted. Pedal pulse and sensation present. Patient is able to bear weight. IV fluids infusing per MD order. Patient complained of pain, medicated with IV Dilaudid per PRN order. Patient has voided. Bed low and locked, call light within reach. No needs expressed at this time. _____________________________________________     09/05/21 0130   Dual Skin Pressure Injury Assessment   Dual Skin Pressure Injury Assessment WDL   Second Care Provider (Based on [address] [place] [address]) Concepcion Martin RN   Skin Integumentary    Pressure  Injury Documentation No Pressure Injury Noted-Pressure Ulcer Prevention Initiated   Skin Color Red  (right knee)   Skin Condition/Temp Hot  (right knee)     _____________________________________________    Problem: Falls - Risk of  Goal: *Absence of Falls  Description: Document Marii Fall Risk and appropriate interventions in the flowsheet.   Outcome: Progressing Towards Goal  Note: Fall Risk Interventions:  Mobility Interventions: Communicate number of staff needed for ambulation/transfer       Medication Interventions: Patient to call before getting OOB, Teach patient to arise slowly    Elimination Interventions: Call light in reach    Problem: Pain  Goal: *Control of Pain  Outcome: Progressing Towards Goal

## 2021-09-06 LAB
ANION GAP SERPL CALC-SCNC: 7 MMOL/L (ref 7–16)
BASOPHILS # BLD: 0 K/UL (ref 0–0.2)
BASOPHILS NFR BLD: 0 % (ref 0–2)
BUN SERPL-MCNC: 7 MG/DL (ref 6–23)
CALCIUM SERPL-MCNC: 8.7 MG/DL (ref 8.3–10.4)
CHLORIDE SERPL-SCNC: 105 MMOL/L (ref 98–107)
CO2 SERPL-SCNC: 26 MMOL/L (ref 21–32)
CREAT SERPL-MCNC: 0.71 MG/DL (ref 0.8–1.5)
DIFFERENTIAL METHOD BLD: ABNORMAL
EOSINOPHIL # BLD: 0.1 K/UL (ref 0–0.8)
EOSINOPHIL NFR BLD: 1 % (ref 0.5–7.8)
ERYTHROCYTE [DISTWIDTH] IN BLOOD BY AUTOMATED COUNT: 12.5 % (ref 11.9–14.6)
GLUCOSE SERPL-MCNC: 101 MG/DL (ref 65–100)
HCT VFR BLD AUTO: 45 % (ref 41.1–50.3)
HGB BLD-MCNC: 15.1 G/DL (ref 13.6–17.2)
IMM GRANULOCYTES # BLD AUTO: 0.1 K/UL (ref 0–0.5)
IMM GRANULOCYTES NFR BLD AUTO: 1 % (ref 0–5)
LYMPHOCYTES # BLD: 2.4 K/UL (ref 0.5–4.6)
LYMPHOCYTES NFR BLD: 16 % (ref 13–44)
MCH RBC QN AUTO: 31.9 PG (ref 26.1–32.9)
MCHC RBC AUTO-ENTMCNC: 33.6 G/DL (ref 31.4–35)
MCV RBC AUTO: 94.9 FL (ref 79.6–97.8)
MONOCYTES # BLD: 2.4 K/UL (ref 0.1–1.3)
MONOCYTES NFR BLD: 16 % (ref 4–12)
NEUTS SEG # BLD: 10.1 K/UL (ref 1.7–8.2)
NEUTS SEG NFR BLD: 67 % (ref 43–78)
NRBC # BLD: 0 K/UL (ref 0–0.2)
PLATELET # BLD AUTO: 189 K/UL (ref 150–450)
PMV BLD AUTO: 9 FL (ref 9.4–12.3)
POTASSIUM SERPL-SCNC: 3.8 MMOL/L (ref 3.5–5.1)
RBC # BLD AUTO: 4.74 M/UL (ref 4.23–5.6)
SODIUM SERPL-SCNC: 138 MMOL/L (ref 136–145)
URATE SERPL-MCNC: 5.5 MG/DL (ref 2.6–6)
VANCOMYCIN TROUGH SERPL-MCNC: 8.6 UG/ML (ref 5–20)
WBC # BLD AUTO: 15.1 K/UL (ref 4.3–11.1)

## 2021-09-06 PROCEDURE — 65270000029 HC RM PRIVATE

## 2021-09-06 PROCEDURE — 85025 COMPLETE CBC W/AUTO DIFF WBC: CPT

## 2021-09-06 PROCEDURE — 84550 ASSAY OF BLOOD/URIC ACID: CPT

## 2021-09-06 PROCEDURE — 74011250636 HC RX REV CODE- 250/636: Performed by: HOSPITALIST

## 2021-09-06 PROCEDURE — 74011250637 HC RX REV CODE- 250/637: Performed by: HOSPITALIST

## 2021-09-06 PROCEDURE — 2709999900 HC NON-CHARGEABLE SUPPLY

## 2021-09-06 PROCEDURE — 36415 COLL VENOUS BLD VENIPUNCTURE: CPT

## 2021-09-06 PROCEDURE — 74011250637 HC RX REV CODE- 250/637: Performed by: PHYSICIAN ASSISTANT

## 2021-09-06 PROCEDURE — 74011000258 HC RX REV CODE- 258: Performed by: HOSPITALIST

## 2021-09-06 PROCEDURE — 80202 ASSAY OF VANCOMYCIN: CPT

## 2021-09-06 PROCEDURE — 74011250636 HC RX REV CODE- 250/636: Performed by: PHYSICIAN ASSISTANT

## 2021-09-06 PROCEDURE — 80048 BASIC METABOLIC PNL TOTAL CA: CPT

## 2021-09-06 RX ADMIN — VANCOMYCIN HYDROCHLORIDE 1250 MG: 10 INJECTION, POWDER, LYOPHILIZED, FOR SOLUTION INTRAVENOUS at 04:24

## 2021-09-06 RX ADMIN — Medication 10 ML: at 05:45

## 2021-09-06 RX ADMIN — Medication 10 ML: at 21:11

## 2021-09-06 RX ADMIN — OXYCODONE 10 MG: 5 TABLET ORAL at 13:17

## 2021-09-06 RX ADMIN — AMLODIPINE BESYLATE 5 MG: 5 TABLET ORAL at 08:24

## 2021-09-06 RX ADMIN — Medication 81 MG: at 21:10

## 2021-09-06 RX ADMIN — SODIUM CHLORIDE 100 ML/HR: 900 INJECTION, SOLUTION INTRAVENOUS at 04:33

## 2021-09-06 RX ADMIN — OXYCODONE 10 MG: 5 TABLET ORAL at 04:27

## 2021-09-06 RX ADMIN — OXYCODONE 10 MG: 5 TABLET ORAL at 21:10

## 2021-09-06 RX ADMIN — Medication 10 ML: at 15:01

## 2021-09-06 RX ADMIN — VANCOMYCIN HYDROCHLORIDE 1000 MG: 1 INJECTION, POWDER, LYOPHILIZED, FOR SOLUTION INTRAVENOUS at 21:11

## 2021-09-06 RX ADMIN — DOCUSATE SODIUM 50MG AND SENNOSIDES 8.6MG 2 TABLET: 8.6; 5 TABLET, FILM COATED ORAL at 08:23

## 2021-09-06 RX ADMIN — Medication 10 ML: at 15:00

## 2021-09-06 RX ADMIN — VANCOMYCIN HYDROCHLORIDE 1250 MG: 10 INJECTION, POWDER, LYOPHILIZED, FOR SOLUTION INTRAVENOUS at 15:00

## 2021-09-06 RX ADMIN — OXYCODONE 10 MG: 5 TABLET ORAL at 08:26

## 2021-09-06 RX ADMIN — CEFTRIAXONE SODIUM 2 G: 2 INJECTION, POWDER, FOR SOLUTION INTRAMUSCULAR; INTRAVENOUS at 13:17

## 2021-09-06 RX ADMIN — HYDROMORPHONE HYDROCHLORIDE 1 MG: 1 INJECTION, SOLUTION INTRAMUSCULAR; INTRAVENOUS; SUBCUTANEOUS at 15:09

## 2021-09-06 RX ADMIN — Medication 81 MG: at 08:23

## 2021-09-06 RX ADMIN — COLCHICINE 0.6 MG: 0.6 TABLET, FILM COATED ORAL at 08:24

## 2021-09-06 NOTE — PROGRESS NOTES
Shift assessment complete. Patient sitting in bed alert and oriented. Respirations are even and unlabored. Bowel sounds are active. Left knee is red, swollen, edema present, and hot to touch. Heart rate is elevated. Patient complains of pain in right knee and IV diluadid given.

## 2021-09-06 NOTE — PROGRESS NOTES
MD aware of vital signs and MEWS score, notified via [employer]. No orders received at this time. Will continue to monitor.       09/05/21 2246   Vital Signs   Temp (!) 102.9 °F (39.4 °C)  (tylenol given, MD aware)   Temp Source Oral   Pulse (Heart Rate) (!) 118  (MD aware)   Resp Rate 20   O2 Sat (%) 96 %   Level of Consciousness Alert (0)   /76   MAP (Calculated) 94   MEWS Score 5

## 2021-09-06 NOTE — PROGRESS NOTES
[facility] Hospitalist Consult   Admit Date:  2021  6:04 PM   Name:  Basil Bustillo   Age:  49 y.o. Sex:  male  :  1970   MRN:  [id number]     Presenting Complaint: Fever and Knee Pain    Reason(s) for Admission: Knee effusion, right [M25.461]     Hospitalists consulted by Lashanda Marcos MD for: Hypertension    History of Presenting Illness:   Basil Bustillo is a 49 y.o. male with history of hypertension who was admitted for right knee swelling. Patient has right knee swelling that has been progressively worsening over the past week. Fever on admission. He had arthrocentesis done on admission. Started on  vancomycin pending culture results. Orthopedics primary. Hospitalist consulted for management of hypertension. This morning patient states that he has worsening pain in his right knee with swelling. Pain in his right knee 10/10 in severity, sharp, restricted range of motion due to pain. No alleviating factors. Aggravated by movement. No injury to the right knee. He reports he had gouty arthritis in his right knee about 10 years ago. No chest pain no palpitations. No cough no shortness of breath. No nausea no vomiting. Subjective:   patient reports he still has pain in the right knee although improved today compared to on admission. Pain in the right knee worse with movement. Still febrile with T-max of 102.9F last night. Review of Systems:  10 systems reviewed and negative except as noted in HPI. Assessment & Plan: Active Problems: This is a 60-year-old male with    Concerns for septic arthritis of the right knee  Knee effusion, right (2021)  ? Gouty arthritis versus septic arthritis. Per primary orthopedics. Continue vancomycin.  Add ceftriaxone. Add colchicine.  blood cultures on admission negative at 48 hours. Culture from right knee negative so far. Continue ceftriaxone, vancomycin. Continue colchicine. Hypertension  Add amlodipine 5 mg p.o. daily. 9/6 continue amlodipine. Hydralazine as needed. DVT prophylaxis SCDs  GI prophylaxis none    Disposition per primary orthopedics. Active Hospital Problems    Diagnosis Date Noted    Knee effusion, right 09/05/2021       Past Medical History:   Diagnosis Date    Excessive daytime sleepiness 12/15/2016    Hemorrhoid     Hypercholesterolemia     Hypertension     Morbid obesity ([address]     Snoring 12/15/2016      No past surgical history on file. No Known Allergies   Social History     Tobacco Use    Smoking status: Former Smoker    Smokeless tobacco: Never Used   Substance Use Topics    Alcohol use: Yes     Comment: occasional drinker      Family History   Problem Relation Age of Onset    Hypertension Mother     Sleep Apnea Other     Gout Father       Family history reviewed and negative unless otherwise noted above. There is no immunization history on file for this patient. Objective:     Patient Vitals for the past 24 hrs:   Temp Pulse Resp BP SpO2   09/06/21 1121 98.4 °F (36.9 °C) (!) 110 18 136/83 96 %   09/06/21 0741 99.7 °F (37.6 °C) 99 18 136/88 96 %   09/06/21 0300 98.9 °F (37.2 °C) 93 20 (!) 141/78 97 %   09/05/21 2246 (!) 102.9 °F (39.4 °C) (!) 118 20 129/76 96 %   09/05/21 1930 98.3 °F (36.8 °C) (!) 116  116/83 96 %   09/05/21 1536 98.5 °F (36.9 °C) (!) 104 18 112/69 96 %     Oxygen Therapy  O2 Sat (%): 96 % (09/06/21 1121)  Pulse via Oximetry: 88 beats per minute (09/04/21 2200)  O2 Device: None (Room air) (09/06/21 1121)    Estimated body mass index is 25.63 kg/m² as calculated from the following:    Height as of this encounter: 5' 5\" (1.651 m). Weight as of this encounter: 69.9 kg (154 lb). No intake or output data in the 24 hours ending 09/06/21 1419      Physical Exam:  General:    Well nourished. No overt distress  Head:  Normocephalic, atraumatic  Eyes:  Sclerae appear normal.  Pupils equally round. ENT:  Nares appear normal, no drainage.   Moist oral mucosa  Neck:  No restricted ROM. Trachea midline   CV:   RRR. No m/r/g. No jugular venous distension. Lungs:   CTAB. No wheezing, rhonchi, or rales. Respirations even, unlabored  Abdomen: Bowel sounds present. Soft, nontender, nondistended. Extremities: No cyanosis or clubbing. Swelling, erythema, right knee, decreased range of motion, tenderness,  Skin:     No rashes and normal coloration. Warm and dry. Neuro:  Cranial nerves II-XII grossly intact. Sensation intact  Psych:  Normal mood and affect. Alert and oriented x3    I have reviewed ordered lab tests and independently visualized imaging below:    Last 24hr Labs:  Recent Results (from the past 24 hour(s))   CBC WITH AUTOMATED DIFF    Collection Time: 09/06/21  4:46 AM   Result Value Ref Range    WBC 15.1 (H) 4.3 - 11.1 K/uL    RBC 4.74 4.23 - 5.6 M/uL    HGB 15.1 13.6 - 17.2 g/dL    HCT 45.0 41.1 - 50.3 %    MCV 94.9 79.6 - 97.8 FL    MCH 31.9 26.1 - 32.9 PG    MCHC 33.6 31.4 - 35.0 g/dL    RDW 12.5 11.9 - 14.6 %    PLATELET 732 891 - 949 K/uL    MPV 9.0 (L) 9.4 - 12.3 FL    ABSOLUTE NRBC 0.00 0.0 - 0.2 K/uL    DF AUTOMATED      NEUTROPHILS 67 43 - 78 %    LYMPHOCYTES 16 13 - 44 %    MONOCYTES 16 (H) 4.0 - 12.0 %    EOSINOPHILS 1 0.5 - 7.8 %    BASOPHILS 0 0.0 - 2.0 %    IMMATURE GRANULOCYTES 1 0.0 - 5.0 %    ABS. NEUTROPHILS 10.1 (H) 1.7 - 8.2 K/UL    ABS. LYMPHOCYTES 2.4 0.5 - 4.6 K/UL    ABS. MONOCYTES 2.4 (H) 0.1 - 1.3 K/UL    ABS. EOSINOPHILS 0.1 0.0 - 0.8 K/UL    ABS. BASOPHILS 0.0 0.0 - 0.2 K/UL    ABS. IMM.  GRANS. 0.1 0.0 - 0.5 K/UL   METABOLIC PANEL, BASIC    Collection Time: 09/06/21  4:46 AM   Result Value Ref Range    Sodium 138 136 - 145 mmol/L    Potassium 3.8 3.5 - 5.1 mmol/L    Chloride 105 98 - 107 mmol/L    CO2 26 21 - 32 mmol/L    Anion gap 7 7 - 16 mmol/L    Glucose 101 (H) 65 - 100 mg/dL    BUN 7 6 - 23 MG/DL    Creatinine 0.71 (L) 0.8 - 1.5 MG/DL    GFR est AA >60 >60 ml/min/1.73m2    GFR est non-AA >60 >60 ml/min/1.73m2 Calcium 8.7 8.3 - 10.4 MG/DL   URIC ACID    Collection Time: 09/06/21  4:46 AM   Result Value Ref Range    Uric acid 5.5 2.6 - 6.0 MG/DL       All Micro Results     Procedure Component Value Units Date/Time    CULTURE, BODY FLUID Farzad Renee STAIN [[id number]] Collected: 09/04/21 1855    Order Status: Completed Specimen: Synovial Fluid Updated: 09/06/21 0712     Special Requests: SYNOVIAL FLUID        GRAM STAIN 14 TO 16 WBC'S/OIF      NO DEFINITE ORGANISM SEEN        Culture result: NO GROWTH 1 DAY       BLOOD CULTURE [[id number]] Collected: 09/04/21 1653    Order Status: Completed Specimen: Blood Updated: 09/06/21 0238     Special Requests: --        RIGHT  FOREARM       Culture result: NO GROWTH 2 DAYS       BLOOD CULTURE [[id number]] Collected: 09/04/21 1851    Order Status: Completed Specimen: Blood Updated: 09/06/21 0238     Special Requests: --        LEFT  Antecubital       Culture result: NO GROWTH 2 DAYS       COVID-19 RAPID TEST [[id number]] Collected: 09/04/21 1854    Order Status: Completed Specimen: Nasopharyngeal Updated: 09/04/21 1927     Specimen source Nasopharyngeal        COVID-19 rapid test Not detected        Comment:      The specimen is NEGATIVE for SARS-CoV-2, the novel coronavirus associated with COVID-19. A negative result does not rule out COVID-19. This test has been authorized by the FDA under an Emergency Use Authorization (EUA) for use by authorized laboratories. Fact sheet for Healthcare Providers: ConventionUpdate.co.nz  Fact sheet for Patients: ConventionUpdate.co.nz       Methodology: Isothermal Nucleic Acid Amplification               Other Studies:  No results found.     Current Meds:  Current Facility-Administered Medications   Medication Dose Route Frequency    sodium chloride (NS) flush 5-40 mL  5-40 mL IntraVENous Q8H    sodium chloride (NS) flush 5-40 mL  5-40 mL IntraVENous PRN    oxyCODONE IR (ROXICODONE) tablet 5-10 mg 5-10 mg Oral Q4H PRN    HYDROmorphone (DILAUDID) injection 1 mg  1 mg IntraVENous Q3H PRN    naloxone (NARCAN) injection 0.2-0.4 mg  0.2-0.4 mg IntraVENous Q10MIN PRN    promethazine (PHENERGAN) tablet 25 mg  25 mg Oral Q6H PRN    diphenhydrAMINE (BENADRYL) capsule 25 mg  25 mg Oral Q4H PRN    senna-docusate (PERICOLACE) 8.6-50 mg per tablet 2 Tablet  2 Tablet Oral DAILY    aspirin delayed-release tablet 81 mg  81 mg Oral Q12H    ondansetron (ZOFRAN ODT) tablet 8 mg  8 mg Oral Q8H PRN    hydrALAZINE (APRESOLINE) tablet 50 mg  50 mg Oral Q6H PRN    amLODIPine (NORVASC) tablet 5 mg  5 mg Oral DAILY    acetaminophen (TYLENOL) tablet 1,000 mg  1,000 mg Oral Q6H PRN    colchicine tablet 0.6 mg  0.6 mg Oral DAILY    cefTRIAXone (ROCEPHIN) 2 g in 0.9% sodium chloride (MBP/ADV) 50 mL MBP  2 g IntraVENous Q24H    vancomycin (VANCOCIN) 1250 mg in  ml infusion  1,250 mg IntraVENous Q12H    Vancomycin Trough Reminder   Other ONCE    sodium chloride (NS) flush 5-10 mL  5-10 mL IntraVENous Q8H    sodium chloride (NS) flush 5-10 mL  5-10 mL IntraVENous PRN       Signed:  Khoa Liu MD    Part of this note may have been written by using a voice dictation software. The note has been proof read but may still contain some grammatical/other typographical errors.

## 2021-09-06 NOTE — PROGRESS NOTES
Pt in bed resting. Right knee swollen, red, and hot to touch. Pt able to plantar/dorsiflex, sensation present, pedal pulse 2+. Complains of 6/10 pain, medicated per PRN order. Will continue to assess. Pt NPO at this time. Bed locked, in lowest position, call light within reach.

## 2021-09-06 NOTE — PROGRESS NOTES
Pharmacokinetic Consult to Pharmacist    Basiladrienne Bustillo is a 49 y.o. male being treated for sepsis with ceftriaxone and vancomycin. Height: 5' 5\" (165.1 cm)  Weight: 69.9 kg (154 lb)  Lab Results   Component Value Date/Time    BUN 7 09/06/2021 04:46 AM    Creatinine 0.71 (L) 09/06/2021 04:46 AM    WBC 15.1 (H) 09/06/2021 04:46 AM    Procalcitonin <0.05 09/04/2021 04:53 PM    Lactic acid 1.3 09/04/2021 04:53 PM    Lactic acid 2.9 (H) 12/14/2013 03:57 PM      Estimated Creatinine Clearance: 108.3 mL/min (A) (based on SCr of 0.71 mg/dL (L)). CULTURES:  Results     Procedure Component Value Units Date/Time    CULTURE, BODY FLUID Sveta Lobe STAIN [[id number]] Collected: 09/04/21 1855    Order Status: Completed Specimen: Synovial Fluid Updated: 09/06/21 0712     Special Requests: SYNOVIAL FLUID        GRAM STAIN 14 TO 16 WBC'S/OIF      NO DEFINITE ORGANISM SEEN        Culture result: NO GROWTH 1 DAY       COVID-19 RAPID TEST [[id number]] Collected: 09/04/21 1854    Order Status: Completed Specimen: Nasopharyngeal Updated: 09/04/21 1927     Specimen source Nasopharyngeal        COVID-19 rapid test Not detected        Comment:      The specimen is NEGATIVE for SARS-CoV-2, the novel coronavirus associated with COVID-19. A negative result does not rule out COVID-19. This test has been authorized by the FDA under an Emergency Use Authorization (EUA) for use by authorized laboratories.         Fact sheet for Healthcare Providers: kstattoo.com  Fact sheet for Patients: kstattoo.com       Methodology: Isothermal Nucleic Acid Amplification         BLOOD CULTURE [[id number]] Collected: 09/04/21 1851    Order Status: Completed Specimen: Blood Updated: 09/06/21 0238     Special Requests: --        LEFT  Antecubital       Culture result: NO GROWTH 2 DAYS       BLOOD CULTURE [[id number]] Collected: 09/04/21 1653    Order Status: Completed Specimen: Blood Updated: 09/06/21 5125 Special Requests: --        RIGHT  FOREARM       Culture result: NO GROWTH 2 DAYS               Lab Results   Component Value Date/Time    Vancomycin,trough 8.6 09/06/2021 01:55 PM       Day 3 of vancomycin. Goal trough is 15-20. We will change the dose to 1g q8h. Will continue to follow patient and order levels when clinically indicated.       Thank you,  Shell Pan, NoeD

## 2021-09-06 NOTE — PROGRESS NOTES
2021         Post Op day: * No surgery found *     Admit Date: 2021  Admit Diagnosis: Knee effusion, right [M25.461]  No surgery found  No surgery found    Subjective: Doing well, No complaints, No SOB, No Chest Pain, No Nausea or Vomitting. Admitted for acutely swollen knee. Synovial fluid sent for analysis and gram stain. Pending growth and crystals. Patient currently NPO. PT/OT:         Activity Response:  Tolerated well                   Weight Bearing Status: WBAT  BMP:  Recent Labs     21   CREA 0.71* 0.82   BUN 7 11    138   K 3.8 3.6    103   CO2 26 30   AGAP 7 5*   * 143*     Patient Vitals for the past 8 hrs:   BP Temp Pulse Resp SpO2   21 0741 136/88 99.7 °F (37.6 °C) 99 18 96 %   21 0300 (!) 141/78 98.9 °F (37.2 °C) 93 20 97 %     Temp (24hrs), Av.8 °F (37.7 °C), Min:98.3 °F (36.8 °C), Max:102.9 °F (39.4 °C)    CBC:  Recent Labs     21   WBC 15.1* 14.2*   GRANS 67 71   MONOS 16* 10   EOS 1 1   ANEU 10.1* 10.1*   ABL 2.4 2.5   HGB 15.1 15.5   HCT 45.0 45.0    212       Microbiology:     All Micro Results     Procedure Component Value Units Date/Time    CULTURE, BODY FLUID Madlyn Nadir STAIN [[id number]] Collected: 21    Order Status: Completed Specimen: Synovial Fluid Updated: 21     Special Requests: SYNOVIAL FLUID        GRAM STAIN 14 TO 16 WBC'S/OIF      NO DEFINITE ORGANISM SEEN        Culture result: NO GROWTH 1 DAY       BLOOD CULTURE [[id number]] Collected: 21    Order Status: Completed Specimen: Blood Updated: 21 0238     Special Requests: --        RIGHT  FOREARM       Culture result: NO GROWTH 2 DAYS       BLOOD CULTURE [[id number]] Collected: 21    Order Status: Completed Specimen: Blood Updated: 21 0238     Special Requests: --        LEFT  Antecubital       Culture result: NO GROWTH 2 DAYS       COVID-19 RAPID TEST [[id number]] Collected: 09/04/21 4239    Order Status: Completed Specimen: Nasopharyngeal Updated: 09/04/21 1927     Specimen source Nasopharyngeal        COVID-19 rapid test Not detected        Comment:      The specimen is NEGATIVE for SARS-CoV-2, the novel coronavirus associated with COVID-19. A negative result does not rule out COVID-19. This test has been authorized by the FDA under an Emergency Use Authorization (EUA) for use by authorized laboratories. Fact sheet for Healthcare Providers: ConventionUpdate.co.nz  Fact sheet for Patients: ConventionUpdate.co.nz       Methodology: Isothermal Nucleic Acid Amplification             Objective: Vital Signs are Stable, No Acute Distress, Alert and Oriented  Passive ROM 0-80 with pain at ends of ROM,  Neurovascular exam is normal.    Assessment:  Patient Active Problem List   Diagnosis Code    Snoring R06.83    BMI 29.0-29.9,adult Z68.29    Hypertriglyceridemia E78.1    External hemorrhoids without complication V29.0    Knee effusion, right M25.461       Plan:   Differential septic joint vs inflammatory (I.e. gout)  Patient has history of gout. Pending growth on cultures. Crystals pending  Patient on emperic Vanc and rocephin   Patient on colchicine  Patient currently NPO awaiting culture results.      Signed By: GARY Ott

## 2021-09-07 VITALS
OXYGEN SATURATION: 95 % | HEART RATE: 97 BPM | BODY MASS INDEX: 25.66 KG/M2 | RESPIRATION RATE: 18 BRPM | WEIGHT: 154 LBS | TEMPERATURE: 97.9 F | DIASTOLIC BLOOD PRESSURE: 76 MMHG | HEIGHT: 65 IN | SYSTOLIC BLOOD PRESSURE: 147 MMHG

## 2021-09-07 LAB
ANION GAP SERPL CALC-SCNC: 4 MMOL/L (ref 7–16)
BACTERIA SPEC CULT: NORMAL
BASOPHILS # BLD: 0 K/UL (ref 0–0.2)
BASOPHILS NFR BLD: 0 % (ref 0–2)
BUN SERPL-MCNC: 9 MG/DL (ref 6–23)
CALCIUM SERPL-MCNC: 8.8 MG/DL (ref 8.3–10.4)
CHLORIDE SERPL-SCNC: 102 MMOL/L (ref 98–107)
CO2 SERPL-SCNC: 32 MMOL/L (ref 21–32)
CREAT SERPL-MCNC: 0.83 MG/DL (ref 0.8–1.5)
DIFFERENTIAL METHOD BLD: ABNORMAL
EOSINOPHIL # BLD: 0.1 K/UL (ref 0–0.8)
EOSINOPHIL NFR BLD: 1 % (ref 0.5–7.8)
ERYTHROCYTE [DISTWIDTH] IN BLOOD BY AUTOMATED COUNT: 12.2 % (ref 11.9–14.6)
GLUCOSE SERPL-MCNC: 98 MG/DL (ref 65–100)
GRAM STN SPEC: NORMAL
GRAM STN SPEC: NORMAL
HCT VFR BLD AUTO: 42.5 % (ref 41.1–50.3)
HGB BLD-MCNC: 13.9 G/DL (ref 13.6–17.2)
IMM GRANULOCYTES # BLD AUTO: 0 K/UL (ref 0–0.5)
IMM GRANULOCYTES NFR BLD AUTO: 0 % (ref 0–5)
LYMPHOCYTES # BLD: 2.1 K/UL (ref 0.5–4.6)
LYMPHOCYTES NFR BLD: 19 % (ref 13–44)
MCH RBC QN AUTO: 31.4 PG (ref 26.1–32.9)
MCHC RBC AUTO-ENTMCNC: 32.7 G/DL (ref 31.4–35)
MCV RBC AUTO: 96.2 FL (ref 79.6–97.8)
MONOCYTES # BLD: 1.6 K/UL (ref 0.1–1.3)
MONOCYTES NFR BLD: 14 % (ref 4–12)
NEUTS SEG # BLD: 7.1 K/UL (ref 1.7–8.2)
NEUTS SEG NFR BLD: 65 % (ref 43–78)
NRBC # BLD: 0 K/UL (ref 0–0.2)
PLATELET # BLD AUTO: 207 K/UL (ref 150–450)
PMV BLD AUTO: 9.3 FL (ref 9.4–12.3)
POTASSIUM SERPL-SCNC: 3.5 MMOL/L (ref 3.5–5.1)
RBC # BLD AUTO: 4.42 M/UL (ref 4.23–5.6)
SERVICE CMNT-IMP: NORMAL
SODIUM SERPL-SCNC: 138 MMOL/L (ref 136–145)
WBC # BLD AUTO: 11 K/UL (ref 4.3–11.1)

## 2021-09-07 PROCEDURE — 36415 COLL VENOUS BLD VENIPUNCTURE: CPT

## 2021-09-07 PROCEDURE — 3E0U33Z INTRODUCTION OF ANTI-INFLAMMATORY INTO JOINTS, PERCUTANEOUS APPROACH: ICD-10-PCS | Performed by: ORTHOPAEDIC SURGERY

## 2021-09-07 PROCEDURE — 80048 BASIC METABOLIC PNL TOTAL CA: CPT

## 2021-09-07 PROCEDURE — 85025 COMPLETE CBC W/AUTO DIFF WBC: CPT

## 2021-09-07 PROCEDURE — 74011000250 HC RX REV CODE- 250: Performed by: ORTHOPAEDIC SURGERY

## 2021-09-07 PROCEDURE — 74011250636 HC RX REV CODE- 250/636: Performed by: HOSPITALIST

## 2021-09-07 PROCEDURE — 20610 DRAIN/INJ JOINT/BURSA W/O US: CPT | Performed by: PHYSICIAN ASSISTANT

## 2021-09-07 PROCEDURE — 74011250637 HC RX REV CODE- 250/637: Performed by: HOSPITALIST

## 2021-09-07 PROCEDURE — 74011250636 HC RX REV CODE- 250/636: Performed by: ORTHOPAEDIC SURGERY

## 2021-09-07 PROCEDURE — 74011250637 HC RX REV CODE- 250/637: Performed by: PHYSICIAN ASSISTANT

## 2021-09-07 PROCEDURE — 3E0U3BZ INTRODUCTION OF ANESTHETIC AGENT INTO JOINTS, PERCUTANEOUS APPROACH: ICD-10-PCS | Performed by: ORTHOPAEDIC SURGERY

## 2021-09-07 PROCEDURE — 0S9C3ZX DRAINAGE OF RIGHT KNEE JOINT, PERCUTANEOUS APPROACH, DIAGNOSTIC: ICD-10-PCS | Performed by: ORTHOPAEDIC SURGERY

## 2021-09-07 RX ORDER — CELECOXIB 200 MG/1
200 CAPSULE ORAL 2 TIMES DAILY
Qty: 60 CAPSULE | Refills: 0 | Status: SHIPPED | OUTPATIENT
Start: 2021-09-07 | End: 2021-10-07

## 2021-09-07 RX ORDER — HYDROCODONE BITARTRATE AND ACETAMINOPHEN 5; 325 MG/1; MG/1
1-2 TABLET ORAL
Status: DISCONTINUED | OUTPATIENT
Start: 2021-09-07 | End: 2021-09-07 | Stop reason: HOSPADM

## 2021-09-07 RX ORDER — HYDROCODONE BITARTRATE AND ACETAMINOPHEN 5; 325 MG/1; MG/1
1-2 TABLET ORAL
Qty: 36 TABLET | Refills: 0 | Status: SHIPPED | OUTPATIENT
Start: 2021-09-07 | End: 2021-09-10

## 2021-09-07 RX ORDER — COLCHICINE 0.6 MG/1
0.6 TABLET ORAL DAILY
Qty: 30 TABLET | Refills: 0 | Status: SHIPPED | OUTPATIENT
Start: 2021-09-08 | End: 2021-12-17

## 2021-09-07 RX ORDER — CELECOXIB 200 MG/1
200 CAPSULE ORAL 2 TIMES DAILY
Status: DISCONTINUED | OUTPATIENT
Start: 2021-09-07 | End: 2021-09-07 | Stop reason: HOSPADM

## 2021-09-07 RX ORDER — LIDOCAINE HYDROCHLORIDE 10 MG/ML
5 INJECTION INFILTRATION; PERINEURAL ONCE
Status: COMPLETED | OUTPATIENT
Start: 2021-09-07 | End: 2021-09-07

## 2021-09-07 RX ORDER — AMLODIPINE BESYLATE 5 MG/1
5 TABLET ORAL DAILY
Qty: 30 TABLET | Refills: 0 | Status: SHIPPED | OUTPATIENT
Start: 2021-09-08 | End: 2021-12-17

## 2021-09-07 RX ORDER — METHYLPREDNISOLONE ACETATE 80 MG/ML
80 INJECTION, SUSPENSION INTRA-ARTICULAR; INTRALESIONAL; INTRAMUSCULAR; SOFT TISSUE ONCE
Status: COMPLETED | OUTPATIENT
Start: 2021-09-07 | End: 2021-09-07

## 2021-09-07 RX ORDER — HYDROCODONE BITARTRATE AND ACETAMINOPHEN 5; 325 MG/1; MG/1
1-2 TABLET ORAL
Qty: 36 TABLET | Refills: 0 | Status: SHIPPED | OUTPATIENT
Start: 2021-09-07 | End: 2021-09-07

## 2021-09-07 RX ORDER — CELECOXIB 200 MG/1
200 CAPSULE ORAL 2 TIMES DAILY
Qty: 60 CAPSULE | Refills: 0 | Status: SHIPPED | OUTPATIENT
Start: 2021-09-07 | End: 2021-09-07 | Stop reason: SDUPTHER

## 2021-09-07 RX ADMIN — Medication 10 ML: at 04:14

## 2021-09-07 RX ADMIN — AMLODIPINE BESYLATE 5 MG: 5 TABLET ORAL at 08:54

## 2021-09-07 RX ADMIN — VANCOMYCIN HYDROCHLORIDE 1000 MG: 1 INJECTION, POWDER, LYOPHILIZED, FOR SOLUTION INTRAVENOUS at 04:14

## 2021-09-07 RX ADMIN — METHYLPREDNISOLONE ACETATE 80 MG: 80 INJECTION, SUSPENSION INTRA-ARTICULAR; INTRALESIONAL; INTRAMUSCULAR; SOFT TISSUE at 12:30

## 2021-09-07 RX ADMIN — COLCHICINE 0.6 MG: 0.6 TABLET, FILM COATED ORAL at 08:53

## 2021-09-07 RX ADMIN — OXYCODONE 10 MG: 5 TABLET ORAL at 08:54

## 2021-09-07 RX ADMIN — Medication 10 ML: at 05:55

## 2021-09-07 RX ADMIN — OXYCODONE 10 MG: 5 TABLET ORAL at 03:48

## 2021-09-07 RX ADMIN — LIDOCAINE HYDROCHLORIDE 5 ML: 10 INJECTION, SOLUTION INFILTRATION; PERINEURAL at 12:30

## 2021-09-07 RX ADMIN — Medication 81 MG: at 08:54

## 2021-09-07 RX ADMIN — HYDROCODONE BITARTRATE AND ACETAMINOPHEN 2 TABLET: 5; 325 TABLET ORAL at 14:24

## 2021-09-07 NOTE — H&P
H&P    Patient ID:  Moe Mckinley  [id number]  5 y.o.  1970  Surgeon:  Hanny Alberto MD  Date of Surgery: * No surgery found *  Procedure: Right Total Knee Arthroplasty  Primary Care Physician: Jessica Olivarez NP        Subjective:  Moe Mckinley is a 49 y.o.  male who presents with right knee pain. He has history of right knee pain for about 2 weeks. He presented to the ER on 9/4 with a right knee effusion, pain and redness. He also had a fever although most 103. He does have a history of gout but is not had a flareup in several years he states. He denies any other recent fall or injury to the right knee. Past Medical History:   Diagnosis Date    Excessive daytime sleepiness 12/15/2016    Hemorrhoid     Hypercholesterolemia     Hypertension     Morbid obesity ([facility] [address]     Snoring 12/15/2016      No past surgical history on file. Family History   Problem Relation Age of Onset    Hypertension Mother     Sleep Apnea Other     Gout Father       Social History     Tobacco Use    Smoking status: Former Smoker    Smokeless tobacco: Never Used   Substance Use Topics    Alcohol use: Yes     Comment: occasional drinker       Prior to Admission medications    Medication Sig Start Date End Date Taking? Authorizing Provider   amLODIPine (NORVASC) 5 mg tablet Take 1 Tablet by mouth daily. 9/8/21  Yes Guzman Voss NP     No Known Allergies     REVIEW OF SYSTEMS:  CONSTITUTIONAL: Denies fever, decreased appetite, weight loss/gain, night sweats or fatigue. HEENT: Denies vision or hearing changes. denies glasses. denies hearing aids. CARDIAC: Denies CP, palpitations, rheumatic fever, murmur, peripheral edema, carotid artery disease or syncopal episodes. RESPIRATORY: Denies dyspnea on exertion, asthma, COPD or orthopnea. GI: Denies GERD, history of GI bleed or melena, PUD, hepatitis or cirrhosis. : Denies dysuria, hematuria. denies BPH symptoms. HEMATOLOGIC: Denies anemia or blood disorders. ENDOCRINE: Denies thyroid disease. MUSCULOSKELETAL: See HPI. NEUROLOGIC: Denies seizure, peripheral neuropathy or memory loss. PSYCH: Denies depression, anxiety or insomnia. SKIN: Denies rash or open sores. Objective:    PHYSICAL EXAM  GENERAL:   Patient Vitals for the past 8 hrs:   BP Temp Pulse Resp SpO2   09/07/21 1135 (!) 147/76 97.9 °F (36.6 °C) 97 18 95 %   09/07/21 0723 127/83 97.9 °F (36.6 °C) 94 20 94 %    EYES: PERRL. EOM intact. MOUTH:Teeth and Gums normal. NECK: Full ROM. Trachea midline. No thyromegaly or JVD. CARDIOVASCULAR: Regular rate and rhythm. No murmur or gallops. No carotid bruits. Peripheral pulses: radial 2 +, PT 2+, DP 2+ bilaterally. LUNGS: CTA bilaterally. No wheezes, rhonchi or rales. GI: positive BS. Abdomen nontender. NEUROLOGIC: Alert and oriented x 3. Bilateral equal strong had grasp and bilateral equal strong plantar flexion and dorsiflexion. GAIT: abnormal MUSCULOSKELETAL: The right knee has a large joint effusion with mild erythema present. There is diffuse tenderness with palpation. ROM: Limited with pain. Tenderness: over the medial and lateral joint lines. Luci Lionel SKIN: No rash, bruising    Labs:    Recent Results (from the past 24 hour(s))   VANCOMYCIN, TROUGH    Collection Time: 09/06/21  1:55 PM   Result Value Ref Range    Vancomycin,trough 8.6 5 - 20 ug/mL   CBC WITH AUTOMATED DIFF    Collection Time: 09/07/21  4:36 AM   Result Value Ref Range    WBC 11.0 4.3 - 11.1 K/uL    RBC 4.42 4.23 - 5.6 M/uL    HGB 13.9 13.6 - 17.2 g/dL    HCT 42.5 41.1 - 50.3 %    MCV 96.2 79.6 - 97.8 FL    MCH 31.4 26.1 - 32.9 PG    MCHC 32.7 31.4 - 35.0 g/dL    RDW 12.2 11.9 - 14.6 %    PLATELET 186 436 - 406 K/uL    MPV 9.3 (L) 9.4 - 12.3 FL    ABSOLUTE NRBC 0.00 0.0 - 0.2 K/uL    DF AUTOMATED      NEUTROPHILS 65 43 - 78 %    LYMPHOCYTES 19 13 - 44 %    MONOCYTES 14 (H) 4.0 - 12.0 %    EOSINOPHILS 1 0.5 - 7.8 %    BASOPHILS 0 0.0 - 2.0 %    IMMATURE GRANULOCYTES 0 0.0 - 5.0 %    ABS.  NEUTROPHILS 7.1 1.7 - 8.2 K/UL    ABS. LYMPHOCYTES 2.1 0.5 - 4.6 K/UL    ABS. MONOCYTES 1.6 (H) 0.1 - 1.3 K/UL    ABS. EOSINOPHILS 0.1 0.0 - 0.8 K/UL    ABS. BASOPHILS 0.0 0.0 - 0.2 K/UL    ABS. IMM. GRANS. 0.0 0.0 - 0.5 K/UL   METABOLIC PANEL, BASIC    Collection Time: 09/07/21  4:36 AM   Result Value Ref Range    Sodium 138 136 - 145 mmol/L    Potassium 3.5 3.5 - 5.1 mmol/L    Chloride 102 98 - 107 mmol/L    CO2 32 21 - 32 mmol/L    Anion gap 4 (L) 7 - 16 mmol/L    Glucose 98 65 - 100 mg/dL    BUN 9 6 - 23 MG/DL    Creatinine 0.83 0.8 - 1.5 MG/DL    GFR est AA >60 >60 ml/min/1.73m2    GFR est non-AA >60 >60 ml/min/1.73m2    Calcium 8.8 8.3 - 10.4 MG/DL       Xray Right knee: She has a large joint effusion with no significant degenerative changes present. Also no acute fracture identified. Assessment:  Large joint effusion right knee  Patient Active Problem List   Diagnosis Code    Snoring R06.83    BMI 29.0-29.9,adult Z68.29    Hypertriglyceridemia E78.1    External hemorrhoids without complication R35.1    Knee effusion, right M25.461       Plan:  The right knee was aspirated in the ER. This was sent for laboratory evaluation including culture, Gram stain,  cell count and crystals. We will wait for definitive analysis to formulate treatment plan. He remained on empiric antibiotics. Pain medication as needed. He will be kept n.p.o. in case surgical intervention is required. We will continue to monitor.     Patient discussed with Dr. Duncan Singleton and he agrees    Signed:  GARY Esposito 9/7/2021

## 2021-09-07 NOTE — PROGRESS NOTES
Pt working toward goals. Pain given twice this shift.  Pt bed low/locked call light and personal items in reach

## 2021-09-07 NOTE — DISCHARGE SUMMARY
[facility]  Total Joint Discharge Summary      Patient ID:  Carlos Manuel Briceño  [id number]  49 y.o.  1970    Admit date: 9/4/2021  Discharge date and time: 9-7-21  Admitting Physician: Jose G Chino MD  Surgeon: Same  Admission Diagnoses: Knee effusion, right [M25.461]  Discharge Diagnoses: Active Problems:    Knee effusion, right (9/5/2021)                                  Hospital Medications given:   [unfilled]  [unfilled]  [unfilled]    Discharge Medications given:  Current Discharge Medication List      START taking these medications    Details   amLODIPine (NORVASC) 5 mg tablet Take 1 Tablet by mouth daily. Qty: 30 Tablet, Refills: 0      HYDROcodone-acetaminophen (NORCO) 5-325 mg per tablet Take 1-2 Tablets by mouth every four (4) hours as needed for Pain for up to 3 days. Max Daily Amount: 12 Tablets. Qty: 36 Tablet, Refills: 0    Associated Diagnoses: Knee effusion, right      celecoxib (CELEBREX) 200 mg capsule Take 1 Capsule by mouth two (2) times a day for 30 days. Qty: 60 Capsule, Refills: 0              Additional DVT Prophylaxis:  MIAN Hose,Plexi-Pulse    Postoperative transfusions:   none  Post Op complications: none    Hemoglobin at discharge:   Lab Results   Component Value Date/Time    HGB 13.9 09/07/2021 04:36 AM       Wound appears to be healing without any evidence of infection. PT/OT:         Activity Response: Tolerated well  Assistive Device: Cane (comment)                Discharged to: home    Discharge instructions:  -Rx pain medication given  -Resume pre hospital diet             -Resume home medications per medical continuation form     -Ambulate with walker, appropriate total joint protocol  -Follow up in office as needed. Follow up with Family doctor.       Signed:  Cesar Canavan, PA  9/7/2021  1:37 PM

## 2021-09-07 NOTE — PROGRESS NOTES
[facility]ist Service Progress Note    INTERVAL HISTORY  / Subjective:    Pt is a 48 yo male with pmh HTN who was admitted per ortho 09/04 for right knee joint effusion with fever. He had arthrocentesis on admission, cultures negative thus far. Has been on Vanc x 3 days. Hospitalist consulted for HTN management. Pt was started on Amlodipine inpatient. Assessment / Plan:    Right knee joint effusion  Culture without growth   On Vanc and Rocephin D 3  Ortho managing but work up appears more consistent with gout than infectious etiology     HTN  Stable on Amlodipine   Recommend pt d/c on this and have PCP follow up in 2 weeks       Chief Complaint : Fever and Knee Pain        Objective:  Visit Vitals  /83   Pulse 94   Temp 97.9 °F (36.6 °C)   Resp 20   Ht 5' 5\" (1.651 m)   Wt 69.9 kg (154 lb)   SpO2 94%   BMI 25.63 kg/m²                 Physical Exam:  General: No acute distress, speaking in full sentences, no use of accessory muscles   HEENT: Pupils equal and reactive to light and accommodation, oropharynx is clear   Neck: Supple, no lymphadenopathy, no JVD   Lungs: Clear to auscultation bilaterally   Cardiovascular: Regular rate and rhythm with normal S1 and S2   Abdomen: Soft, nontender, nondistended, normoactive bowel sounds   Extremities: No cyanosis clubbing.   Right knee tender, red, warm, decreased ROM   Neuro: Nonfocal, A&O x3   Psych: Normal affect     Intake and Output:  Date 09/06/21 0700 - 09/07/21 0659 09/07/21 0700 - 09/08/21 0659   Shift [id number] 1900-0659 24 Hour Total 4199-5634 1583-7076 24 Hour Total   INTAKE   Shift Total(mL/kg)         OUTPUT   Urine(mL/kg/hr)           Urine Occurrence(s)  1 x 1 x      Shift Total(mL/kg)         NET         Weight (kg) 69.9 69.9 69.9 69.9 69.9 69.9       LAB:  Admission on 09/04/2021   Component Date Value    Special Requests: 09/04/2021                      Value:RIGHT  FOREARM      Culture result: 09/04/2021 NO GROWTH 3 DAYS     Special Requests: 09/04/2021                      Value:LEFT  Antecubital      Culture result: 09/04/2021 NO GROWTH 3 DAYS     Lactic acid 09/04/2021 1.3     WBC 09/04/2021 14.2*    RBC 09/04/2021 4.82     HGB 09/04/2021 15.5     HCT 09/04/2021 45.0     MCV 09/04/2021 93.4     MCH 09/04/2021 32.2     MCHC 09/04/2021 34.4     RDW 09/04/2021 12.5     PLATELET 81/08/7511 708     MPV 09/04/2021 8.9*    ABSOLUTE NRBC 09/04/2021 0.00     DF 09/04/2021 AUTOMATED     NEUTROPHILS 09/04/2021 71     LYMPHOCYTES 09/04/2021 18     MONOCYTES 09/04/2021 10     EOSINOPHILS 09/04/2021 1     BASOPHILS 09/04/2021 0     IMMATURE GRANULOCYTES 09/04/2021 1     ABS. NEUTROPHILS 09/04/2021 10.1*    ABS. LYMPHOCYTES 09/04/2021 2.5     ABS. MONOCYTES 09/04/2021 1.4*    ABS. EOSINOPHILS 09/04/2021 0.1     ABS. BASOPHILS 09/04/2021 0.0     ABS. IMM. GRANS. 09/04/2021 0.1     Sodium 09/04/2021 138     Potassium 09/04/2021 3.6     Chloride 09/04/2021 103     CO2 09/04/2021 30     Anion gap 09/04/2021 5*    Glucose 09/04/2021 143*    BUN 09/04/2021 11     Creatinine 09/04/2021 0.82     GFR est AA 09/04/2021 >60     GFR est non-AA 09/04/2021 >60     Calcium 09/04/2021 8.3     Bilirubin, total 09/04/2021 0.8     ALT (SGPT) 09/04/2021 54     AST (SGOT) 09/04/2021 16     Alk.  phosphatase 09/04/2021 46*    Protein, total 09/04/2021 6.8     Albumin 09/04/2021 3.2*    Globulin 09/04/2021 3.6*    A-G Ratio 09/04/2021 0.9*    Ventricular Rate 09/04/2021 98     Atrial Rate 09/04/2021 98     P-R Interval 09/04/2021 156     QRS Duration 09/04/2021 84     Q-T Interval 09/04/2021 322     QTC Calculation (Bezet) 09/04/2021 411     Calculated P Axis 09/04/2021 2     Calculated R Axis 09/04/2021 8     Calculated T Axis 09/04/2021 -44     Diagnosis 09/04/2021                      Value:Normal sinus rhythm  T wave abnormality, consider inferolateral ischemia  Abnormal ECG  No previous ECGs available  Confirmed by NIKKY MONROY ([id number]) on 9/5/2021 1:06:03 PM      Procalcitonin 09/04/2021 <0.05     Fluid Type: 09/04/2021 SYNOVIAL FLUID     Protein total, body fld. 09/04/2021 2.9     FLUID TYPE(7) 09/04/2021 SYNOVIAL FLUID     Crystals, body fluid 09/04/2021 PENDING     Fluid Type: 09/04/2021 SYNOVIAL FLUID     Glucose, body fld. 09/04/2021 3     BODY FLUID TYPE 09/04/2021 SYNOVIAL FLUID     FLUID COLOR 09/04/2021 YELLOW     FLUID APPEARANCE 09/04/2021 TURBID     FLUID RBC CT. 09/04/2021 1,000     FLUID WBC COUNT 09/04/2021 28,959     [facility]     [facility]  09/04/2021 1     Special Requests: 09/04/2021 SYNOVIAL FLUID     GRAM STAIN 09/04/2021 14 TO 16 WBC'S/OIF     GRAM STAIN 09/04/2021 NO DEFINITE ORGANISM SEEN     Culture result: 09/04/2021 NO GROWTH 2 DAYS     C-Reactive protein 09/04/2021 5.5*    Specimen source 09/04/2021 Nasopharyngeal     COVID-19 rapid test 09/04/2021 Not detected     SARS-CoV-2 09/04/2021 Please find results under separate order     WBC 09/06/2021 15.1*    RBC 09/06/2021 4.74     HGB 09/06/2021 15.1     HCT 09/06/2021 45.0     MCV 09/06/2021 94.9     MCH 09/06/2021 31.9     MCHC 09/06/2021 33.6     RDW 09/06/2021 12.5     PLATELET 47/93/0311 412     MPV 09/06/2021 9.0*    ABSOLUTE NRBC 09/06/2021 0.00     DF 09/06/2021 AUTOMATED     NEUTROPHILS 09/06/2021 67     LYMPHOCYTES 09/06/2021 16     MONOCYTES 09/06/2021 16*    EOSINOPHILS 09/06/2021 1     BASOPHILS 09/06/2021 0     IMMATURE GRANULOCYTES 09/06/2021 1     ABS. NEUTROPHILS 09/06/2021 10.1*    ABS. LYMPHOCYTES 09/06/2021 2.4     ABS. MONOCYTES 09/06/2021 2.4*    ABS. EOSINOPHILS 09/06/2021 0.1     ABS. BASOPHILS 09/06/2021 0.0     ABS. IMM.  GRANS. 09/06/2021 0.1     Sodium 09/06/2021 138     Potassium 09/06/2021 3.8     Chloride 09/06/2021 105     CO2 09/06/2021 26     Anion gap 09/06/2021 7     Glucose 09/06/2021 101*    BUN 09/06/2021 7     Creatinine 09/06/2021 0.71*    GFR est AA 09/06/2021 >60     GFR est non-AA 09/06/2021 >60     Calcium 09/06/2021 8.7     Uric acid 09/06/2021 5.5     Vancomycin,trough 09/06/2021 8.6     WBC 09/07/2021 11.0     RBC 09/07/2021 4.42     HGB 09/07/2021 13.9     HCT 09/07/2021 42.5     MCV 09/07/2021 96.2     MCH 09/07/2021 31.4     MCHC 09/07/2021 32.7     RDW 09/07/2021 12.2     PLATELET 66/43/9123 822     MPV 09/07/2021 9.3*    ABSOLUTE NRBC 09/07/2021 0.00     DF 09/07/2021 AUTOMATED     NEUTROPHILS 09/07/2021 65     LYMPHOCYTES 09/07/2021 19     MONOCYTES 09/07/2021 14*    EOSINOPHILS 09/07/2021 1     BASOPHILS 09/07/2021 0     IMMATURE GRANULOCYTES 09/07/2021 0     ABS. NEUTROPHILS 09/07/2021 7.1     ABS. LYMPHOCYTES 09/07/2021 2.1     ABS. MONOCYTES 09/07/2021 1.6*    ABS. EOSINOPHILS 09/07/2021 0.1     ABS. BASOPHILS 09/07/2021 0.0     ABS. IMM. GRANS. 09/07/2021 0.0     Sodium 09/07/2021 138     Potassium 09/07/2021 3.5     Chloride 09/07/2021 102     CO2 09/07/2021 32     Anion gap 09/07/2021 4*    Glucose 09/07/2021 98     BUN 09/07/2021 9     Creatinine 09/07/2021 0.83     GFR est AA 09/07/2021 >60     GFR est non-AA 09/07/2021 >60     Calcium 09/07/2021 8.8        IMAGING:  XR CHEST PORT    Result Date: 9/4/2021  No acute findings in the chest     XR KNEE RT 3 V    Result Date: 9/4/2021  Large joint effusion       EKG:  No results found for this or any previous visit.           Clemente Guevara NP  9/7/2021 9:28 AM

## 2021-09-07 NOTE — PROGRESS NOTES
2021         Post Op day: * No surgery found *     Admit Date: 2021  Admit Diagnosis: Knee effusion, right [M25.461]        Subjective: Patient still complaining of severe right knee pain and swelling, No SOB, No Chest Pain, No Nausea or Vomiting     Objective:   Vital Signs are Stable, No Acute Distress, Alert and Oriented,  Neurovascular exam is normal.    The right knee is still very swollen. There is diffuse tenderness upon palpation. Any range of motion elicits pain. There is warmth upon palpation. No significant erythema. Cultures have finalized from the aspiration there was performed in the ER. There is no growth. Assessment / Plan :  Patient Active Problem List   Diagnosis Code    Snoring R06.83    BMI 29.0-29.9,adult Z68.29    Hypertriglyceridemia E78.1    External hemorrhoids without complication D91.4    Knee effusion, right M25.461      Patient Vitals for the past 8 hrs:   BP Temp Pulse Resp SpO2   21 1135 (!) 147/76 97.9 °F (36.6 °C) 97 18 95 %   21 0723 127/83 97.9 °F (36.6 °C) 94 20 94 %    Temp (24hrs), Av.2 °F (36.8 °C), Min:97.9 °F (36.6 °C), Max:98.6 °F (37 °C)    Body mass index is 25.63 kg/m². Lab Results   Component Value Date/Time    HGB 13.9 2021 04:36 AM      Pt seen by and discussed with Supervising Physician   We did perform a right knee aspiration to help reduce the swelling followed by a cortisone injection. This is most likely an inflammatory response due to gout. He is advised on follow-up with his family doctor regarding the situation. He may be discharged home from an orthopedic standpoint. Procedure note: The right knee was prepped with ChloraPrep. Under sterile technique the right knee was aspirated using an 18-gauge needle and 30 cc syringe. Approximately 65 cc of yellow joint fluid was aspirated from the right knee. The right knee was then injected with 80 mg of Depo-Medrol and 4 cc of Xylocaine.   Sterile bandage was applied. Ace wrap was applied to the right knee for compression. The patient tolerated this without difficulty.        Signed By: GARY Loza

## 2021-09-07 NOTE — DISCHARGE INSTRUCTIONS
Patient Education        Open Drainage of a Joint: What to Expect at [address] had surgery to clean out an infection in one or more of your joints. The doctor removed fluid and material from the joint. You may feel sore and have some swelling at the surgical site. Your doctor will give you specific instructions on when you can do your normal activities again, such as driving and going back to work. This care sheet gives you a general idea about how long it will take for you to recover. But each person recovers at a different pace. Follow the steps below to get better as quickly as possible. How can you care for yourself at home? Activity    · Rest when you feel tired.     · You may be able to take showers, if your doctor says it is okay.     · If you do have a drain, follow your doctor's instructions to empty and care for it. Diet    · You can eat your normal diet. If your stomach is upset, try bland, low-fat foods like plain rice, broiled chicken, toast, and yogurt. Medicines    · Your doctor will tell you if and when you can restart your medicines. He or she will also give you instructions about taking any new medicines.     · If you take aspirin or some other blood thinner, ask your doctor if and when to start taking it again. Make sure that you understand exactly what your doctor wants you to do.     · Be safe with medicines. Read and follow all instructions on the label. ? If the doctor gave you a prescription medicine for pain, take it as prescribed. ? If you are not taking a prescription pain medicine, ask your doctor if you can take an over-the-counter medicine.     · If your doctor prescribed antibiotics, take them as directed. Do not stop taking them just because you feel better. You need to take the full course of antibiotics. Incision care    · You will have a dressing over the cut (incision). A dressing helps the incision heal and protects it.  Your doctor will tell you how to take care of this.     · Wash the area daily with warm, soapy water, and pat it dry. Don't use hydrogen peroxide or alcohol. They can slow healing.     · Your doctor will tell you when to come back if your wound dressing needs to be changed or removed. Exercise    · Your doctor will give you instructions on what activities are okay for you to do. Ice and elevation    · Put ice or a cold pack on your joint for 10 to 20 minutes at a time. Try to do this every 1 to 2 hours for the next 3 days (when you are awake) or until the swelling goes down. Put a thin cloth between the ice and your skin.     · If possible, prop up the sore joint on a pillow when you ice it or anytime you sit or lie down during the next 3 days. Try to keep it above the level of your heart. This will help reduce swelling. Other instructions    · You may need to use crutches after surgery if the joint is used for walking. Use crutches for as long as your doctor tells you to. Your doctor will tell you when you can put weight on the joint. It may help to use a backpack or wear clothes with a lot of pockets to carry items.     · You may wear a sling or brace if you had surgery on the upper part of your body. The doctor will tell you how long you need to wear a support device. Follow-up care is a key part of your treatment and safety. Be sure to make and go to all appointments, and call your doctor if you are having problems. It's also a good idea to know your test results and keep a list of the medicines you take. When should you call for help? Call 911 anytime you think you may need emergency care. For example, call if:    · You passed out (lost consciousness).     · You have severe trouble breathing.     · You have sudden chest pain and shortness of breath, or you cough up blood.    Call your doctor now or seek immediate medical care if:    · You have pain that does not get better after you take pain medicine.     · You have loose stitches, or your incision comes open.     · You have a drain, and it comes out.     · You are bleeding through your dressing. A small amount of blood is normal.     · You have symptoms of a blood clot in your arm or leg (called a deep vein thrombosis). These may include:  ? Pain in the arm, calf, back of the knee, thigh, or groin. ? Redness and swelling in the arm, leg, or groin.     · You have signs of infection, such as:  ? Increased pain, swelling, warmth, or redness. ? Red streaks leading from the incision. ? Pus draining from the incision. ? A fever. Watch closely for changes in your health, and be sure to contact your doctor if:    · You do not get better as expected. Where can you learn more? Go to http://www.gray.com/  Enter D258 in the search box to learn more about \"Open Drainage of a Joint: What to Expect at Home. \"  Current as of: February 26, 2020               Content Version: 12.8  © 2006-2021 "[employer]. Care instructions adapted under license by [employer] (which disclaims liability or warranty for this information). If you have questions about a medical condition or this instruction, always ask your healthcare professional. [place] [address] any warranty or liability for your use of this information. Patient Education        High Blood Pressure: Care Instructions  Overview     It's normal for blood pressure to go up and down throughout the day. But if it stays up, you have high blood pressure. Another name for high blood pressure is hypertension. Despite what a lot of people think, high blood pressure usually doesn't cause headaches or make you feel dizzy or lightheaded. It usually has no symptoms. But it does increase your risk of stroke, heart attack, and other problems. You and your doctor will talk about your risks of these problems based on your blood pressure. Your doctor will give you a goal for your blood pressure. Your goal will be based on your health and your age. Lifestyle changes, such as eating healthy and being active, are always important to help lower blood pressure. You might also take medicine to reach your blood pressure goal.  Follow-up care is a key part of your treatment and safety. Be sure to make and go to all appointments, and call your doctor if you are having problems. It's also a good idea to know your test results and keep a list of the medicines you take. How can you care for yourself at home? Medical treatment  · If you stop taking your medicine, your blood pressure will go back up. You may take one or more types of medicine to lower your blood pressure. Be safe with medicines. Take your medicine exactly as prescribed. Call your doctor if you think you are having a problem with your medicine. · Talk to your doctor before you start taking aspirin every day. Aspirin can help certain people lower their risk of a heart attack or stroke. But taking aspirin isn't right for everyone, because it can cause serious bleeding. · See your doctor regularly. You may need to see the doctor more often at first or until your blood pressure comes down. · If you are taking blood pressure medicine, talk to your doctor before you take decongestants or anti-inflammatory medicine, such as ibuprofen. Some of these medicines can raise blood pressure. · Learn how to check your blood pressure at home. Lifestyle changes  · Stay at a healthy weight. This is especially important if you put on weight around the waist. Losing even 10 pounds can help you lower your blood pressure. · If your doctor recommends it, get more exercise. Walking is a good choice. Bit by bit, increase the amount you walk every day. Try for at least 30 minutes on most days of the week. You also may want to swim, bike, or do other activities. · Avoid or limit alcohol. Talk to your doctor about whether you can drink any alcohol.   · Try to limit how much sodium you eat to less than 2,300 milligrams (mg) a day. Your doctor may ask you to try to eat less than 1,500 mg a day. · Eat plenty of fruits (such as bananas and oranges), vegetables, legumes, whole grains, and low-fat dairy products. · Lower the amount of saturated fat in your diet. Saturated fat is found in animal products such as milk, cheese, and meat. Limiting these foods may help you lose weight and also lower your risk for heart disease. · Do not smoke. Smoking increases your risk for heart attack and stroke. If you need help quitting, talk to your doctor about stop-smoking programs and medicines. These can increase your chances of quitting for good. When should you call for help? Call  911 anytime you think you may need emergency care. This may mean having symptoms that suggest that your blood pressure is causing a serious heart or blood vessel problem. Your blood pressure may be over 180/120. For example, call 911 if:    · You have symptoms of a heart attack. These may include:  ? Chest pain or pressure, or a strange feeling in the chest.  ? Sweating. ? Shortness of breath. ? Nausea or vomiting. ? Pain, pressure, or a strange feeling in the back, neck, jaw, or upper belly or in one or both shoulders or arms. ? Lightheadedness or sudden weakness. ? A fast or irregular heartbeat.     · You have symptoms of a stroke. These may include:  ? Sudden numbness, tingling, weakness, or loss of movement in your face, arm, or leg, especially on only one side of your body. ? Sudden vision changes. ? Sudden trouble speaking. ? Sudden confusion or trouble understanding simple statements. ? Sudden problems with walking or balance. ? A sudden, severe headache that is different from past headaches.     · You have severe back or belly pain. Do not wait until your blood pressure comes down on its own. Get help right away.   Call your doctor now or seek immediate care if:    · Your blood pressure is much higher than normal (such as 180/120 or higher), but you don't have symptoms.     · You think high blood pressure is causing symptoms, such as:  ? Severe headache.  ? Blurry vision. Watch closely for changes in your health, and be sure to contact your doctor if:    · Your blood pressure measures higher than your doctor recommends at least 2 times. That means the top number is higher or the bottom number is higher, or both.     · You think you may be having side effects from your blood pressure medicine. Where can you learn more? Go to http://www.gray.com/  Enter [id number] in the search box to learn more about \"High Blood Pressure: Care Instructions. \"  Current as of: August 31, 2020               Content Version: 12.8  © 1949-1889 [employer]. Care instructions adapted under license by [employer] (which disclaims liability or warranty for this information). If you have questions about a medical condition or this instruction, always ask your healthcare professional. [place] [address] any warranty or liability for your use of this information.

## 2021-09-08 LAB
BODY FLD TYPE: NORMAL
CRYSTALS FLD MICRO: NORMAL
SARS COV-2, XPGCVT: NEGATIVE

## 2021-09-09 LAB
BACTERIA SPEC CULT: NORMAL
BACTERIA SPEC CULT: NORMAL
SERVICE CMNT-IMP: NORMAL
SERVICE CMNT-IMP: NORMAL

## 2021-12-17 ENCOUNTER — APPOINTMENT (OUTPATIENT)
Dept: GENERAL RADIOLOGY | Age: 51
End: 2021-12-17
Attending: STUDENT IN AN ORGANIZED HEALTH CARE EDUCATION/TRAINING PROGRAM
Payer: COMMERCIAL

## 2021-12-17 ENCOUNTER — HOSPITAL ENCOUNTER (EMERGENCY)
Age: 51
Discharge: HOME | End: 2021-12-18
Attending: STUDENT IN AN ORGANIZED HEALTH CARE EDUCATION/TRAINING PROGRAM
Payer: COMMERCIAL

## 2021-12-17 DIAGNOSIS — M25.561 ACUTE PAIN OF RIGHT KNEE: Primary | ICD-10-CM

## 2021-12-17 DIAGNOSIS — M25.461 EFFUSION OF RIGHT KNEE: ICD-10-CM

## 2021-12-17 LAB
ALBUMIN SERPL-MCNC: 3.7 G/DL (ref 3.5–5)
ALBUMIN/GLOB SERPL: 0.8 {RATIO} (ref 1.2–3.5)
ALP SERPL-CCNC: 86 U/L (ref 50–136)
ALT SERPL-CCNC: 64 U/L (ref 12–65)
ANION GAP SERPL CALC-SCNC: 7 MMOL/L (ref 7–16)
AST SERPL-CCNC: 41 U/L (ref 15–37)
BASOPHILS # BLD: 0 K/UL (ref 0–0.2)
BASOPHILS NFR BLD: 0 % (ref 0–2)
BILIRUB SERPL-MCNC: 0.4 MG/DL (ref 0.2–1.1)
BUN SERPL-MCNC: 15 MG/DL (ref 6–23)
CALCIUM SERPL-MCNC: 9.5 MG/DL (ref 8.3–10.4)
CHLORIDE SERPL-SCNC: 106 MMOL/L (ref 98–107)
CO2 SERPL-SCNC: 26 MMOL/L (ref 21–32)
CREAT SERPL-MCNC: 0.96 MG/DL (ref 0.8–1.5)
CRP SERPL-MCNC: 1.8 MG/DL (ref 0–0.9)
DIFFERENTIAL METHOD BLD: ABNORMAL
EOSINOPHIL # BLD: 0 K/UL (ref 0–0.8)
EOSINOPHIL NFR BLD: 0 % (ref 0.5–7.8)
ERYTHROCYTE [DISTWIDTH] IN BLOOD BY AUTOMATED COUNT: 12.2 % (ref 11.9–14.6)
ERYTHROCYTE [SEDIMENTATION RATE] IN BLOOD: 40 MM/HR (ref 0–20)
GLOBULIN SER CALC-MCNC: 4.6 G/DL (ref 2.3–3.5)
GLUCOSE SERPL-MCNC: 97 MG/DL (ref 65–100)
HCT VFR BLD AUTO: 43.8 % (ref 41.1–50.3)
HGB BLD-MCNC: 15.1 G/DL (ref 13.6–17.2)
IMM GRANULOCYTES # BLD AUTO: 0.1 K/UL (ref 0–0.5)
IMM GRANULOCYTES NFR BLD AUTO: 0 % (ref 0–5)
LACTATE SERPL-SCNC: 0.9 MMOL/L (ref 0.4–2)
LYMPHOCYTES # BLD: 3.1 K/UL (ref 0.5–4.6)
LYMPHOCYTES NFR BLD: 16 % (ref 13–44)
MCH RBC QN AUTO: 31.1 PG (ref 26.1–32.9)
MCHC RBC AUTO-ENTMCNC: 34.5 G/DL (ref 31.4–35)
MCV RBC AUTO: 90.3 FL (ref 79.6–97.8)
MONOCYTES # BLD: 1.8 K/UL (ref 0.1–1.3)
MONOCYTES NFR BLD: 9 % (ref 4–12)
NEUTS SEG # BLD: 14.3 K/UL (ref 1.7–8.2)
NEUTS SEG NFR BLD: 74 % (ref 43–78)
NRBC # BLD: 0 K/UL (ref 0–0.2)
PLATELET # BLD AUTO: 305 K/UL (ref 150–450)
PMV BLD AUTO: 9.2 FL (ref 9.4–12.3)
POTASSIUM SERPL-SCNC: 3.6 MMOL/L (ref 3.5–5.1)
PROCALCITONIN SERPL-MCNC: 0.07 NG/ML (ref 0–0.49)
PROT SERPL-MCNC: 8.3 G/DL (ref 6.3–8.2)
RBC # BLD AUTO: 4.85 M/UL (ref 4.23–5.6)
SODIUM SERPL-SCNC: 139 MMOL/L (ref 136–145)
WBC # BLD AUTO: 19.4 K/UL (ref 4.3–11.1)

## 2021-12-17 PROCEDURE — 96366 THER/PROPH/DIAG IV INF ADDON: CPT

## 2021-12-17 PROCEDURE — 96365 THER/PROPH/DIAG IV INF INIT: CPT

## 2021-12-17 PROCEDURE — 87205 SMEAR GRAM STAIN: CPT

## 2021-12-17 PROCEDURE — 87040 BLOOD CULTURE FOR BACTERIA: CPT

## 2021-12-17 PROCEDURE — 80053 COMPREHEN METABOLIC PANEL: CPT

## 2021-12-17 PROCEDURE — 84145 PROCALCITONIN (PCT): CPT

## 2021-12-17 PROCEDURE — 85652 RBC SED RATE AUTOMATED: CPT

## 2021-12-17 PROCEDURE — 89050 BODY FLUID CELL COUNT: CPT

## 2021-12-17 PROCEDURE — 89060 EXAM SYNOVIAL FLUID CRYSTALS: CPT

## 2021-12-17 PROCEDURE — 86140 C-REACTIVE PROTEIN: CPT

## 2021-12-17 PROCEDURE — 74011250636 HC RX REV CODE- 250/636: Performed by: STUDENT IN AN ORGANIZED HEALTH CARE EDUCATION/TRAINING PROGRAM

## 2021-12-17 PROCEDURE — 73562 X-RAY EXAM OF KNEE 3: CPT

## 2021-12-17 PROCEDURE — 75810000054 HC ARTHOCENTISIS JOINT

## 2021-12-17 PROCEDURE — 83605 ASSAY OF LACTIC ACID: CPT

## 2021-12-17 PROCEDURE — 85025 COMPLETE CBC W/AUTO DIFF WBC: CPT

## 2021-12-17 PROCEDURE — 99283 EMERGENCY DEPT VISIT LOW MDM: CPT

## 2021-12-17 RX ORDER — VANCOMYCIN 1.75 GRAM/500 ML IN 0.9 % SODIUM CHLORIDE INTRAVENOUS
1750
Status: COMPLETED | OUTPATIENT
Start: 2021-12-17 | End: 2021-12-18

## 2021-12-17 RX ORDER — MELOXICAM 15 MG/1
15 TABLET ORAL DAILY
COMMUNITY

## 2021-12-17 RX ORDER — OMEPRAZOLE 40 MG/1
40 CAPSULE, DELAYED RELEASE ORAL DAILY
COMMUNITY
End: 2022-03-02 | Stop reason: SDUPTHER

## 2021-12-17 RX ADMIN — VANCOMYCIN HYDROCHLORIDE 1750 MG: 10 INJECTION, POWDER, LYOPHILIZED, FOR SOLUTION INTRAVENOUS at 23:04

## 2021-12-18 VITALS
TEMPERATURE: 98.9 F | RESPIRATION RATE: 16 BRPM | HEART RATE: 87 BPM | OXYGEN SATURATION: 99 % | SYSTOLIC BLOOD PRESSURE: 164 MMHG | DIASTOLIC BLOOD PRESSURE: 95 MMHG | WEIGHT: 159 LBS | BODY MASS INDEX: 26.46 KG/M2

## 2021-12-18 LAB
APPEARANCE FLD: NORMAL
COLOR FLD: YELLOW
FLUID COMMENTS, FCOM: NORMAL
LYMPHOCYTES NFR BRONCH MANUAL: 1 %
NEUTROPHILS NFR BRONCH MANUAL: 99 %
NUC CELL # FLD: NORMAL /CU MM
RBC # FLD: 0 /CU MM
SPECIMEN SOURCE FLD: NORMAL

## 2021-12-18 PROCEDURE — 74011250637 HC RX REV CODE- 250/637: Performed by: EMERGENCY MEDICINE

## 2021-12-18 RX ORDER — PREDNISONE 20 MG/1
40 TABLET ORAL DAILY
Qty: 10 TABLET | Refills: 0 | Status: SHIPPED | OUTPATIENT
Start: 2021-12-18 | End: 2021-12-23

## 2021-12-18 RX ORDER — HYDROCODONE BITARTRATE AND ACETAMINOPHEN 5; 325 MG/1; MG/1
1 TABLET ORAL
Qty: 9 TABLET | Refills: 0 | Status: SHIPPED | OUTPATIENT
Start: 2021-12-18 | End: 2021-12-21

## 2021-12-18 RX ORDER — HYDROCODONE BITARTRATE AND ACETAMINOPHEN 7.5; 325 MG/1; MG/1
1 TABLET ORAL
Status: COMPLETED | OUTPATIENT
Start: 2021-12-18 | End: 2021-12-18

## 2021-12-18 RX ADMIN — HYDROCODONE BITARTRATE AND ACETAMINOPHEN 1 TABLET: 7.5; 325 TABLET ORAL at 04:44

## 2021-12-18 NOTE — ED TRIAGE NOTES
Patient ambulatory to triage with use of cane. States having right knee pain since yesterday worse today. States that he has a history of gout seen here in sept for same.  States took meloxicam for pain 30 minutes ago

## 2021-12-18 NOTE — ED NOTES
I have reviewed discharge instructions with the patient. The patient verbalized understanding. Patient left ED via Discharge Method: ambulatory to Home with self. Family on route to pick pt up    Opportunity for questions and clarification provided. Patient given 2 scripts. To continue your aftercare when you leave the hospital, you may receive an automated call from our care team to check in on how you are doing. This is a free service and part of our promise to provide the best care and service to meet your aftercare needs.  If you have questions, or wish to unsubscribe from this service please call [phone]. Thank you for Choosing our [facility].

## 2021-12-18 NOTE — ED PROVIDER NOTES
Mr. Lake Martines a 81-year-old male history of gout, arthritis, hypertension presents to the emergency department chief complaint of right knee pain and swelling. Patient states pain and swelling began yesterday. Patient denies fall/trauma, history of replacement, fevers, nausea, vomiting, diarrhea, shortness of breath, chest pain. Patient took a 15 mg Mobic about an hour and a half ago, with little relief. Patient was seen in the emergency department in September 2021 for similar presentation. At that time, an arthrocentesis was performed and no organisms grew on culture. Patient is ambulatory with a cane. The history is provided by the patient. No  was used. Knee Pain  This is a recurrent problem. The current episode started yesterday. The problem occurs constantly. The problem has been gradually worsening. Pertinent negatives include no chest pain, no abdominal pain, no headaches and no shortness of breath. The symptoms are aggravated by walking (flexion and extension of knee). Nothing relieves the symptoms. Past Medical History:   Diagnosis Date    Excessive daytime sleepiness 12/15/2016    Hemorrhoid     Hypercholesterolemia     Hypertension     Morbid obesity ([address]     Snoring 12/15/2016       No past surgical history on file.       Family History:   Problem Relation Age of Onset    Hypertension Mother     Sleep Apnea Other     Gout Father        Social History     Socioeconomic History    Marital status:      Spouse name: Not on file    Number of children: Not on file    Years of education: Not on file    Highest education level: Not on file   Occupational History    Not on file   Tobacco Use    Smoking status: Former Smoker    Smokeless tobacco: Never Used   Substance and Sexual Activity    Alcohol use: Yes     Comment: occasional drinker    Drug use: No    Sexual activity: Not on file   Other Topics Concern    Not on file   Social History Narrative    Not on file     Social Determinants of Health     Financial Resource Strain:     Difficulty of Paying Living Expenses: Not on file   Food Insecurity:     Worried About Running Out of Food in the Last Year: Not on file    Serge of Food in the Last Year: Not on file   Transportation Needs:     Lack of Transportation (Medical): Not on file    Lack of Transportation (Non-Medical): Not on file   Physical Activity:     Days of Exercise per Week: Not on file    Minutes of Exercise per Session: Not on file   Stress:     Feeling of Stress : Not on file   Social Connections:     Frequency of Communication with Friends and Family: Not on file    Frequency of Social Gatherings with Friends and Family: Not on file    Attends Sikhism Services: Not on file    Active Member of [address] [place] [address] or Organizations: Not on file    Attends Club or Organization Meetings: Not on file    Marital Status: Not on file   Intimate Partner Violence:     Fear of Current or Ex-Partner: Not on file    Emotionally Abused: Not on file    Physically Abused: Not on file    Sexually Abused: Not on file   Housing Stability:     Unable to Pay for Housing in the Last Year: Not on file    Number of [place] in the Last Year: Not on file    Unstable Housing in the Last Year: Not on file         ALLERGIES: Patient has no known allergies. Review of Systems   Constitutional: Negative for chills, fatigue and fever. Respiratory: Negative for cough and shortness of breath. Cardiovascular: Negative for chest pain and palpitations. Gastrointestinal: Negative for abdominal pain, diarrhea, nausea and vomiting. Musculoskeletal: Positive for arthralgias and joint swelling. Skin: Positive for color change. Negative for rash and wound. Neurological: Negative for light-headedness and headaches. All other systems reviewed and are negative.       Vitals:    12/17/21 1932   BP: (!) 181/94   Pulse: 91   Resp: 18   Temp: 99.7 °F (37.6 °C) SpO2: 98%   Weight: 72.1 kg (159 lb)            Physical Exam  Vitals and nursing note reviewed. Constitutional:       General: He is not in acute distress. Appearance: Normal appearance. He is not ill-appearing, toxic-appearing or diaphoretic. HENT:      Head: Normocephalic and atraumatic. Eyes:      General: No scleral icterus. Extraocular Movements: Extraocular movements intact. Cardiovascular:      Rate and Rhythm: Normal rate. Pulses: Normal pulses. Pulmonary:      Effort: Pulmonary effort is normal.      Breath sounds: Normal breath sounds. Abdominal:      Palpations: Abdomen is soft. Tenderness: There is no abdominal tenderness. There is no guarding or rebound. Musculoskeletal:      Cervical back: No rigidity. Comments: Edema of right knee. Mild erythema lateral knee. Fluctuance palpation presents like a fusion. Mild warmth of the skin over right knee. Decreased range of motion with flexion and extension. Tenderness to palpation lateral to right patella. No tenderness to palpation medially, proximal or distal to right knee joint. NoTenderness to palpation posteriorly. DP and PT pulses 2+. No edema distal to right knee joint   Skin:     General: Skin is warm and dry. Findings: Erythema (Mild erythema right knee.) present. No bruising, lesion or rash. Neurological:      Mental Status: He is alert and oriented to person, place, and time. Sensory: No sensory deficit. Psychiatric:         Mood and Affect: Mood normal.         Behavior: Behavior normal.         Thought Content: Thought content normal.         Judgment: Judgment normal.          MDM  Number of Diagnoses or Management Options  Diagnosis management comments: 28-year-old male with history of gout arthritis in right knee presents with 2 days of right knee pain and swelling. No trauma/injury. No history of knee replacement. Vital signs reviewed. Patient is stable.   His blood pressure is elevated-patient states he took his blood pressure medication 1-1/2 hours ago. He asymptomatic. Based on history and physical exam, suspect gout/septic arthritis/pyogenic arthritis/osteomyelitis. I discussed this patient with my attending, Dr. Eileen Dockery. He has contacted orthopedics via [facility]. Radiograph right knee ordered. -Demonstrates large joint effusion. Labs ordered include : CRP, sed rate, procalcitonin, CBC, CMP    Labs Reviewed  CBC WITH AUTOMATED DIFF - Abnormal; Notable for the following components:     WBC                           19.4 (*)               MPV                           9.2 (*)                EOSINOPHILS                   0 (*)                  ABS. NEUTROPHILS              14.3 (*)               ABS. MONOCYTES                1.8 (*)             All other components within normal limits  METABOLIC PANEL, COMPREHENSIVE - Abnormal; Notable for the following components:     AST (SGOT)                    41 (*)                 Protein, total                8.3 (*)                Globulin                      4.6 (*)                A-G Ratio                     0.8 (*)             All other components within normal limits  SED RATE, AUTOMATED - Abnormal; Notable for the following components:     Sed rate, automated           40 (*)              All other components within normal limits  C REACTIVE PROTEIN, QT - Abnormal; Notable for the following components:     C-Reactive protein            1.8 (*)             All other components within normal limits  CULTURE, BLOOD  CULTURE, BLOOD  PROCALCITONIN  LACTIC ACID    IV vancomycin started in the ER. Blood cultures x2 ordered, lactic acid, Gram stain and culture arthrocentesis fluid. Dr. Eileen Dockery performed arthrocentesis of right knee. Per orthopedics, and to stay in the emergency room Gram stain resulted. Ending those results patient will either be discharged home or admitted to the floor.     Dr. Eileen Dockery is taking over the chart at this time , as he has been in contact with Ortho will be entering his procedure note. David Degree, [id number]; 12/17/2021 @11:51 PM Voice dictation software was used during the making of this note. This software is not perfect and grammatical and other typographical errors may be present. This note has not been proofread for errors.  ===================================================================      Mortimer Ohara, DO    Patient seen and worked up initially by David Vazquez PA. Patient with history of prior right knee effusion. Reports worsening pain and swelling to the right knee. Patient with decreased range of motion right knee secondary to pain. X-ray shows large effusion. Labs with white count 19,000, elevated sed rate. Blood cultures obtained, patient given empiric antibiotics, vancomycin IV. Patient consented for right knee arthrocentesis. These results sent to lab and pending at the time of shift change. I did discuss patient with orthopedics who advised to obtain cell count and crystals on the synovial fluid and reconsult them when this has resulted. Dr. Diana Diaz has agreed to assume the care of this patient as of midnight. Please see his documentation for final results and plan. Patient was informed of this transition in care.              Amount and/or Complexity of Data Reviewed  Clinical lab tests: ordered and reviewed  Tests in the radiology section of CPT®: ordered and reviewed  Discussion of test results with the performing providers: yes  Review and summarize past medical records: yes  Discuss the patient with other providers: yes  Independent visualization of images, tracings, or specimens: yes    Risk of Complications, Morbidity, and/or Mortality  Presenting problems: high  Diagnostic procedures: high  Management options: moderate      ED Course as of 12/17/21 2117   Fri Dec 17, 2021   2115 XR KNEE RT 3 V  EXAM: Right knee x-rays.     INDICATION: Pain and swelling.     COMPARISON: Prior right knee x-rays on September 4, 2021 and December 14, 2013.     TECHNIQUE: 3 views.     FINDINGS: No acute fracture, dislocation or bone erosion is seen. 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 There is a large knee joint effusion,  not significantly changed from the most recent x-rays.     IMPRESSION  Large knee joint effusion. [JG]      ED Course User Index  [JG] Huan Nguyen       Arthrocentesis    Date/Time: 12/17/2021 11:54 PM  Performed by: Uyen Galvan DO  Authorized by: Adrienne Merino DO     Consent:     Consent obtained:  Written    Consent given by:  Patient    Risks discussed:  Bleeding, infection, pain and incomplete drainage  Location:     Location:  Knee    Knee:  R knee  Anesthesia (see MAR for exact dosages): Anesthesia method:  None  Procedure details:     Preparation: Patient was prepped and draped in usual sterile fashion      Needle gauge:  18 G    Ultrasound guidance: yes      Approach:  Medial    Aspirate amount:  20cc    Aspirate characteristics:  Yellow and cloudy    Steroid injected: no      Specimen collected: yes    Post-procedure details:     Patient tolerance of procedure:   Tolerated well, no immediate complications

## 2021-12-20 LAB
BACTERIA SPEC CULT: NORMAL
GRAM STN SPEC: NORMAL
GRAM STN SPEC: NORMAL
SERVICE CMNT-IMP: NORMAL

## 2021-12-20 NOTE — CALL BACK NOTE
I called Mr. Brandyn Johnson at 2220 to inquire about how he was doing after discharge from the ER s/p arthrocentesis of his right knee. I spoke with Mr. James Lucas daughter and she stated that Mr. Brandyn Johnson has been afebrile, chills, no body ache, no nausea/vomiting/diarrhea since his treatment in the emergency department. I informed Mr. James Lucas daughter that his preliminary blood cultures came back positive for bacteria, but it is unknown if it was a contaminant or not at this time. I informed mr. Seth's daughter that father should return to the emergency department if he does develop fever, his knee pain becomes worse, he has redness or warmth to his right knee, he has any nausea/vomiting/diarrhea change in mental status. I asked if she had any questions at this time, she did not.

## 2021-12-21 LAB
BACTERIA SPEC CULT: ABNORMAL
GRAM STN SPEC: ABNORMAL
SERVICE CMNT-IMP: ABNORMAL

## 2021-12-22 LAB
BACTERIA SPEC CULT: NORMAL
SERVICE CMNT-IMP: NORMAL

## 2022-01-05 LAB
BODY FLD TYPE: NORMAL
CRYSTALS FLD MICRO: NORMAL

## 2022-03-02 PROBLEM — E78.2 MIXED HYPERLIPIDEMIA: Status: ACTIVE | Noted: 2022-03-02

## 2022-03-02 PROBLEM — K21.9 GASTROESOPHAGEAL REFLUX DISEASE WITHOUT ESOPHAGITIS: Status: ACTIVE | Noted: 2022-03-02

## 2022-03-02 PROBLEM — I10 ESSENTIAL HYPERTENSION: Status: ACTIVE | Noted: 2022-03-02

## 2022-03-02 PROBLEM — M1A.0610 CHRONIC GOUT OF RIGHT KNEE: Status: ACTIVE | Noted: 2022-03-02

## 2022-03-18 PROBLEM — E78.2 MIXED HYPERLIPIDEMIA: Status: ACTIVE | Noted: 2022-03-02

## 2022-03-18 PROBLEM — I10 ESSENTIAL HYPERTENSION: Status: ACTIVE | Noted: 2022-03-02

## 2022-03-19 PROBLEM — K21.9 GASTROESOPHAGEAL REFLUX DISEASE WITHOUT ESOPHAGITIS: Status: ACTIVE | Noted: 2022-03-02

## 2022-03-19 PROBLEM — M25.461 KNEE EFFUSION, RIGHT: Status: ACTIVE | Noted: 2021-09-05

## 2022-03-19 PROBLEM — M1A.0610 CHRONIC GOUT OF RIGHT KNEE: Status: ACTIVE | Noted: 2022-03-02

## 2022-03-19 PROBLEM — K64.4 EXTERNAL HEMORRHOIDS WITHOUT COMPLICATION: Status: ACTIVE | Noted: 2018-01-03

## 2022-03-20 PROBLEM — E78.1 HYPERTRIGLYCERIDEMIA: Status: ACTIVE | Noted: 2018-01-03

## 2022-08-17 RX ORDER — COLCHICINE 0.6 MG/1
TABLET ORAL
Qty: 30 TABLET | Refills: 5 | Status: SHIPPED | OUTPATIENT
Start: 2022-08-17 | End: 2022-10-25 | Stop reason: SDUPTHER

## 2022-09-09 RX ORDER — AMLODIPINE BESYLATE 10 MG/1
TABLET ORAL
Qty: 90 TABLET | Refills: 1 | Status: SHIPPED | OUTPATIENT
Start: 2022-09-09 | End: 2022-10-25 | Stop reason: SDUPTHER

## 2022-10-24 ENCOUNTER — OFFICE VISIT (OUTPATIENT)
Dept: FAMILY MEDICINE CLINIC | Facility: CLINIC | Age: 52
End: 2022-10-24
Payer: COMMERCIAL

## 2022-10-24 VITALS
SYSTOLIC BLOOD PRESSURE: 144 MMHG | WEIGHT: 153.6 LBS | HEART RATE: 99 BPM | TEMPERATURE: 98.2 F | DIASTOLIC BLOOD PRESSURE: 88 MMHG | OXYGEN SATURATION: 80 % | BODY MASS INDEX: 25.59 KG/M2 | HEIGHT: 65 IN

## 2022-10-24 DIAGNOSIS — M1A.0610 IDIOPATHIC CHRONIC GOUT, RIGHT KNEE, WITHOUT TOPHUS (TOPHI): ICD-10-CM

## 2022-10-24 DIAGNOSIS — I10 ESSENTIAL (PRIMARY) HYPERTENSION: Primary | ICD-10-CM

## 2022-10-24 DIAGNOSIS — Z12.11 COLON CANCER SCREENING: ICD-10-CM

## 2022-10-24 DIAGNOSIS — R73.01 IMPAIRED FASTING GLUCOSE: ICD-10-CM

## 2022-10-24 DIAGNOSIS — E78.2 MIXED HYPERLIPIDEMIA: ICD-10-CM

## 2022-10-24 LAB
ALBUMIN SERPL-MCNC: 4.1 G/DL (ref 3.5–5)
ALBUMIN/GLOB SERPL: 1.2 {RATIO} (ref 0.4–1.6)
ALP SERPL-CCNC: 94 U/L (ref 50–136)
ALT SERPL-CCNC: 91 U/L (ref 12–65)
ANION GAP SERPL CALC-SCNC: 7 MMOL/L (ref 2–11)
AST SERPL-CCNC: 48 U/L (ref 15–37)
BILIRUB SERPL-MCNC: 0.5 MG/DL (ref 0.2–1.1)
BUN SERPL-MCNC: 12 MG/DL (ref 6–23)
CALCIUM SERPL-MCNC: 9.6 MG/DL (ref 8.3–10.4)
CHLORIDE SERPL-SCNC: 107 MMOL/L (ref 101–110)
CHOLEST SERPL-MCNC: 276 MG/DL
CO2 SERPL-SCNC: 25 MMOL/L (ref 21–32)
CREAT SERPL-MCNC: 0.9 MG/DL (ref 0.8–1.5)
GLOBULIN SER CALC-MCNC: 3.5 G/DL (ref 2.8–4.5)
GLUCOSE SERPL-MCNC: 100 MG/DL (ref 65–100)
HDLC SERPL-MCNC: 90 MG/DL (ref 40–60)
HDLC SERPL: 3.1 {RATIO}
LDLC SERPL CALC-MCNC: ABNORMAL MG/DL
POTASSIUM SERPL-SCNC: 4 MMOL/L (ref 3.5–5.1)
PROT SERPL-MCNC: 7.6 G/DL (ref 6.3–8.2)
SODIUM SERPL-SCNC: 139 MMOL/L (ref 133–143)
TRIGL SERPL-MCNC: 540 MG/DL (ref 35–150)
URATE SERPL-MCNC: 8.8 MG/DL (ref 2.6–6)
VLDLC SERPL CALC-MCNC: 108 MG/DL (ref 6–23)

## 2022-10-24 PROCEDURE — 99214 OFFICE O/P EST MOD 30 MIN: CPT | Performed by: PHYSICIAN ASSISTANT

## 2022-10-24 ASSESSMENT — PATIENT HEALTH QUESTIONNAIRE - PHQ9
SUM OF ALL RESPONSES TO PHQ QUESTIONS 1-9: 0
2. FEELING DOWN, DEPRESSED OR HOPELESS: 0
SUM OF ALL RESPONSES TO PHQ QUESTIONS 1-9: 0
1. LITTLE INTEREST OR PLEASURE IN DOING THINGS: 0
SUM OF ALL RESPONSES TO PHQ9 QUESTIONS 1 & 2: 0
SUM OF ALL RESPONSES TO PHQ QUESTIONS 1-9: 0
SUM OF ALL RESPONSES TO PHQ QUESTIONS 1-9: 0

## 2022-10-24 NOTE — PROGRESS NOTES
[address] [place] [address]  Phone [phone]  Fax [phone]  Priyanka GODFREY    Patient: Domenica Vaughn  YOB: 1970  Patient Age 47 y.o. Patient sex: male  Medical Record:  [id number]  Visit Date:10/24/2022   Author:  Suzanna Gutierrez. [facility] Note     Chief complaint  Domenica Vaughn  is a 47 y.o. male who was seen on 10/25/22  8:42 AM  for the following reasons:    Chief Complaint   Patient presents with    Follow-up    Rash     R outer ankle x 1 month       Current Medical problems addressed    Patient is a 67-year-old who comes in with a history of hypertension, chronic gout, Excessive daytime sleepiness, hypertriglyceridemia, mixed hyperlipidemia, morbid obesity, reflux. He was last seen on 3/2/2022 with Kayla HAYES. Patient reports gout typically is located in the right knee and last flare was 4 months ago  and notes it happens once a year. patient is currently treated on colchicine. Hyperlipidemia patient is due for labs his last labs were done on 4/4/2022 at that time lipid panel was elevated with total cholesterol 247, triglycerides 237, and . Hypertension patient is treated with amlodipine and blood pressure is borderline    ASSESSMENT AND PLAN    ICD-10-CM    1. Essential (primary) hypertension  I10 amLODIPine (NORVASC) 10 MG tablet      2. Mixed hyperlipidemia  E78.2 Lipid Panel     Comprehensive Metabolic Panel      3. Impaired fasting glucose  R73.01 Lipid Panel     Comprehensive Metabolic Panel      4. Idiopathic chronic gout, right knee, without tophus (tophi)  M1A.0610 Uric Acid     Handicap Placard MISC     colchicine (COLCRYS) 0.6 MG tablet      5. Gastro-esophageal reflux disease without esophagitis  K21.9       6. Colon cancer screening  Z12.11 Cologuard (Fecal DNA Colorectal Cancer Screening)         Sadaf Jane was seen today for follow-up and rash.     Diagnoses and all orders for this visit:    Essential (primary) hypertension  -     amLODIPine (NORVASC) 10 MG tablet; TAKE 1 TABLET BY MOUTH EVERY DAY    Mixed hyperlipidemia  -     Lipid Panel  -     Comprehensive Metabolic Panel    Impaired fasting glucose  -     Lipid Panel  -     Comprehensive Metabolic Panel    Idiopathic chronic gout, right knee, without tophus (tophi)  -     Uric Acid  -     Handicap Placard MISC; by Does not apply route  -     colchicine (COLCRYS) 0.6 MG tablet; TAKE 1 TABLET BY MOUTH DAILY. INDICATIONS: TREATMENT TO PREVENT ACUTE GOUT ATTACK    Gastro-esophageal reflux disease without esophagitis    Colon cancer screening  -     Cologuard (Fecal DNA Colorectal Cancer Screening)    Other orders  -     LDL Cholesterol, Direct    Plan includes the following:  Recommended   No follow-up provider specified. Orders Placed This Encounter   Procedures    Cologuard (Fecal DNA Colorectal Cancer Screening)    Uric Acid    Lipid Panel    Comprehensive Metabolic Panel    LDL Cholesterol, Direct     ATLASORDERNUMBER:[id number]$[id number]:DLDL*Ldl, direct       Past Medical History: Allergies:No Known Allergies    Current Medications:   Medications marked \"taking\" at this time:  Current Outpatient Medications   Medication Sig Dispense Refill    colchicine (COLCRYS) 0.6 MG tablet TAKE 1 TABLET BY MOUTH DAILY. INDICATIONS: TREATMENT TO PREVENT ACUTE GOUT ATTACK 90 tablet 2    amLODIPine (NORVASC) 10 MG tablet TAKE 1 TABLET BY MOUTH EVERY DAY 90 tablet 2    Handicap Placard MISC by Does not apply route 1 each 0    HYDROcodone-acetaminophen (NORCO) 5-325 MG per tablet Take by mouth every 4 hours as needed. No current facility-administered medications for this visit.         Current Problem List:   Patient Active Problem List   Diagnosis    Essential hypertension    Mixed hyperlipidemia    Snoring    BMI 29.0-29.9,adult    Gastroesophageal reflux disease without esophagitis    Knee effusion, right    External hemorrhoids without complication    Chronic gout of right knee    Hypertriglyceridemia       Social History:   Social History     Tobacco Use    Smoking status: Former     Packs/day: 0.10     Types: Cigarettes     Quit date: 2/5/2012     Years since quitting: 10.7    Smokeless tobacco: Never   Substance Use Topics    Alcohol use: Yes       Family History:   Family History   Problem Relation Age of Onset    Gout Father     Sleep Apnea Other     Hypertension Mother        Surgical History:No past surgical history on file. ROS  Review of Systems   Constitutional:  Negative for fever. Eyes:  Negative for visual disturbance. Respiratory:  Negative for cough and shortness of breath. Cardiovascular:  Negative for chest pain. Gastrointestinal:  Negative for blood in stool. Musculoskeletal:  Negative for myalgias. Skin:  Negative for rash. Neurological:  Negative for syncope and weakness. Psychiatric/Behavioral:  Negative for dysphoric mood. BP (!) 144/88   Pulse 99   Temp 98.2 °F (36.8 °C)   Ht 5' 5\" (1.651 m)   Wt 153 lb 9.6 oz (69.7 kg)   SpO2 (!) 80%   BMI 25.56 kg/m²   Body mass index is 25.56 kg/m². Physical Exam    Physical Exam  Vitals and nursing note reviewed. Constitutional:       Appearance: Normal appearance. Eyes:      Conjunctiva/sclera: Conjunctivae normal.   Cardiovascular:      Rate and Rhythm: Normal rate and regular rhythm. Pulmonary:      Effort: Pulmonary effort is normal.      Breath sounds: Normal breath sounds. Musculoskeletal:      Cervical back: Neck supple. Right lower leg: No edema. Left lower leg: No edema. Neurological:      Mental Status: He is alert and oriented to person, place, and time. Psychiatric:         Mood and Affect: Mood normal.        I have reviewed the patient's past medical history, social history and family history and vitals. We have discussed treatment plan and follow up and given patient instructions.   Patient's questions are answered and we will follow up as indicated. No follow-ups on file. Juanita Malcolm PA-C  8:42 AM  10/25/2022

## 2022-10-25 PROBLEM — R73.01 IMPAIRED FASTING GLUCOSE: Status: ACTIVE | Noted: 2022-10-25

## 2022-10-25 PROBLEM — M1A.0610: Status: ACTIVE | Noted: 2022-03-02

## 2022-10-25 PROBLEM — I10 ESSENTIAL (PRIMARY) HYPERTENSION: Status: ACTIVE | Noted: 2022-03-02

## 2022-10-25 LAB — LDLC SERPL DIRECT ASSAY-MCNC: 124 MG/DL

## 2022-10-25 RX ORDER — COLCHICINE 0.6 MG/1
TABLET ORAL
Qty: 90 TABLET | Refills: 2 | Status: SHIPPED | OUTPATIENT
Start: 2022-10-25 | End: 2022-11-14 | Stop reason: SDUPTHER

## 2022-10-25 RX ORDER — AMLODIPINE BESYLATE 10 MG/1
TABLET ORAL
Qty: 90 TABLET | Refills: 2 | Status: SHIPPED | OUTPATIENT
Start: 2022-10-25 | End: 2022-11-14 | Stop reason: SDUPTHER

## 2022-10-25 ASSESSMENT — ENCOUNTER SYMPTOMS
SHORTNESS OF BREATH: 0
BLOOD IN STOOL: 0
COUGH: 0

## 2022-11-14 ENCOUNTER — OFFICE VISIT (OUTPATIENT)
Dept: FAMILY MEDICINE CLINIC | Facility: CLINIC | Age: 52
End: 2022-11-14
Payer: COMMERCIAL

## 2022-11-14 VITALS
SYSTOLIC BLOOD PRESSURE: 138 MMHG | TEMPERATURE: 98.5 F | HEIGHT: 65 IN | HEART RATE: 88 BPM | WEIGHT: 153.5 LBS | OXYGEN SATURATION: 97 % | DIASTOLIC BLOOD PRESSURE: 82 MMHG | BODY MASS INDEX: 25.58 KG/M2

## 2022-11-14 DIAGNOSIS — I10 ESSENTIAL (PRIMARY) HYPERTENSION: ICD-10-CM

## 2022-11-14 DIAGNOSIS — L30.9 ECZEMA, UNSPECIFIED TYPE: ICD-10-CM

## 2022-11-14 DIAGNOSIS — H10.13 ACUTE ALLERGIC CONJUNCTIVITIS OF BOTH EYES: ICD-10-CM

## 2022-11-14 DIAGNOSIS — E78.2 MIXED HYPERLIPIDEMIA: Primary | ICD-10-CM

## 2022-11-14 DIAGNOSIS — F10.10 ALCOHOL ABUSE: ICD-10-CM

## 2022-11-14 DIAGNOSIS — R74.01 TRANSAMINITIS: ICD-10-CM

## 2022-11-14 DIAGNOSIS — M1A.0610 IDIOPATHIC CHRONIC GOUT, RIGHT KNEE, WITHOUT TOPHUS (TOPHI): ICD-10-CM

## 2022-11-14 PROCEDURE — 3074F SYST BP LT 130 MM HG: CPT | Performed by: PHYSICIAN ASSISTANT

## 2022-11-14 PROCEDURE — 3078F DIAST BP <80 MM HG: CPT | Performed by: PHYSICIAN ASSISTANT

## 2022-11-14 PROCEDURE — 99214 OFFICE O/P EST MOD 30 MIN: CPT | Performed by: PHYSICIAN ASSISTANT

## 2022-11-14 RX ORDER — FENOFIBRATE 160 MG/1
160 TABLET ORAL EVERY EVENING
Qty: 90 TABLET | Refills: 1 | Status: SHIPPED | OUTPATIENT
Start: 2022-11-14

## 2022-11-14 RX ORDER — FLUOCINOLONE ACETONIDE 0.11 MG/ML
OIL TOPICAL
Qty: 118.28 ML | Refills: 0 | Status: SHIPPED | OUTPATIENT
Start: 2022-11-14

## 2022-11-14 RX ORDER — ALLOPURINOL 100 MG/1
TABLET ORAL
Qty: 270 TABLET | Refills: 1 | Status: SHIPPED | OUTPATIENT
Start: 2022-11-14

## 2022-11-14 RX ORDER — AMLODIPINE BESYLATE 10 MG/1
TABLET ORAL
Qty: 90 TABLET | Refills: 2 | Status: SHIPPED | OUTPATIENT
Start: 2022-11-14

## 2022-11-14 RX ORDER — SOFT LENS ADJUNCTIVE SOLUTIONS
DROPS OPHTHALMIC (EYE)
Qty: 1 EACH | Refills: 2 | Status: SHIPPED | OUTPATIENT
Start: 2022-11-14

## 2022-11-14 RX ORDER — COLCHICINE 0.6 MG/1
TABLET ORAL
Qty: 90 TABLET | Refills: 2 | Status: SHIPPED | OUTPATIENT
Start: 2022-11-14

## 2022-11-14 ASSESSMENT — PATIENT HEALTH QUESTIONNAIRE - PHQ9
SUM OF ALL RESPONSES TO PHQ QUESTIONS 1-9: 0
SUM OF ALL RESPONSES TO PHQ QUESTIONS 1-9: 0
2. FEELING DOWN, DEPRESSED OR HOPELESS: 0
SUM OF ALL RESPONSES TO PHQ QUESTIONS 1-9: 0
SUM OF ALL RESPONSES TO PHQ QUESTIONS 1-9: 0
SUM OF ALL RESPONSES TO PHQ9 QUESTIONS 1 & 2: 0
1. LITTLE INTEREST OR PLEASURE IN DOING THINGS: 0

## 2022-11-14 ASSESSMENT — ENCOUNTER SYMPTOMS
BLOOD IN STOOL: 0
COUGH: 0
SHORTNESS OF BREATH: 0

## 2022-11-14 NOTE — PROGRESS NOTES
[address] [place] [address]  Phone [phone]  Fax [phone]  Jose Juan GODFREY    Patient: Jorje Mohs  YOB: 1970  Patient Age 47 y.o. Patient sex: male  Medical Record:  [id number]  Visit Date:11/14/2022   Author:  Mariana Teixeira. Kalani Mehta Note     Chief complaint  Jorje Mohs  is a 47 y.o. male who was seen on 11/14/22  10:07 AM  for the following reasons:    Chief Complaint   Patient presents with    Follow-up     Needs Rx for skin       Current Medical problems addressed    Patient is a 64-year-old male with a history of hypertension GERD, Morbid obesity impaired fasting glucose GERD, hypertension, knee effusion,, mixed hyperlipidemia. He previously saw Lukas HAYES. who returns today for follow-up of his last labs were done on 10/25/2022. Was ordered and results are still pending. LDL was elevated mildly at 124. CMP was within normal range other than ALT and AST were elevated at 91 and 48. Lipids noted total cholesterol was 276, triglycerides were 540 and HDL was was 90. Uric acid is elevated 8.8 patient is currently treated for gout with colchicine. Last flair was about 8 months. He drinks more than 10 beer cans a day  plus elissa and discussed importance of reducing numbers to 1-2 days     ASSESSMENT AND PLAN    ICD-10-CM    1. Eczema, unspecified type  L30.9       2. Alcohol abuse  F10.10       3. Mixed hyperlipidemia  E78.2       4. Transaminitis  R74.01       5. Essential (primary) hypertension  I10 amLODIPine (NORVASC) 10 MG tablet      6. Idiopathic chronic gout, right knee, without tophus (tophi)  M1A.0610 colchicine (COLCRYS) 0.6 MG tablet      7. Acute allergic conjunctivitis of both eyes  H10.13 Amb External Referral To Ophthalmology     naphazoline-pheniramine (Wilhemina Formosa) 0.027-0.315 % HENRIETTA         Mitali Lewisn was seen today for follow-up.     Diagnoses and all orders for this visit:    Eczema, unspecified type    Alcohol abuse    Mixed hyperlipidemia    Transaminitis    Essential (primary) hypertension  -     amLODIPine (NORVASC) 10 MG tablet; TAKE 1 TABLET BY MOUTH EVERY DAY    Idiopathic chronic gout, right knee, without tophus (tophi)  -     colchicine (COLCRYS) 0.6 MG tablet; TAKE 1 TABLET BY MOUTH DAILY. INDICATIONS: TREATMENT TO PREVENT ACUTE GOUT ATTACK    Acute allergic conjunctivitis of both eyes  -     Amb External Referral To Ophthalmology  -     naphazoline-pheniramine (OPCON-A) 0.027-0.315 % SOLN; 1 drop to bilateral eyes bid    Other orders  -     fluocinolone (DERMA-SMOOTHE/FS BODY) 0.01 % OIL external oil; Apply to rash on legs tid x 2 week intervals  -     allopurinol (ZYLOPRIM) 100 MG tablet; 1 po daily x 10 days then 2 po daily x 10 days then 3po daily  -     fenofibrate (TRIGLIDE) 160 MG tablet; Take 1 tablet by mouth every evening    Plan includes the followin. Continue chronic medications as prescribed working toward goal of LDL less than 100 (less than 70 if patient is Diabetic or has cardiovascular risk factors) and TG of less than 150 and total cholesterol less than 200.  2.  Discussion/Counseling provided today include: exercise and nutrition               Recommended exercising aerobically for 45 min 5 x a week and that if patient deconditioned to use intervals with rest breaks for an accumlation goal of 45 min. Recommended low fat, lean protein, more green vegetables, less starches and elimination of desserts, sweet drinks and unnecessary carbs. 3.  Follow up: 6 months with labs  Discussed importance of reducing alcohol to reduce further damage to liver    No follow-up provider specified.   Orders Placed This Encounter   Procedures    Amb External Referral To Ophthalmology     Referral Priority:   Routine     Referral Type:   Consult for Advice and Opinion     Referral Reason:   Specialty Services Required     Referral Location:   [place] [address] Requested Specialty:   Ophthalmology     Number of Visits Requested:   1       Past Medical History: Allergies:No Known Allergies    Current Medications:   Medications marked \"taking\" at this time:  Current Outpatient Medications   Medication Sig Dispense Refill    fluocinolone (DERMA-SMOOTHE/FS BODY) 0.01 % OIL external oil Apply to rash on legs tid x 2 week intervals 118.28 mL 0    allopurinol (ZYLOPRIM) 100 MG tablet 1 po daily x 10 days then 2 po daily x 10 days then 3po daily 270 tablet 1    amLODIPine (NORVASC) 10 MG tablet TAKE 1 TABLET BY MOUTH EVERY DAY 90 tablet 2    colchicine (COLCRYS) 0.6 MG tablet TAKE 1 TABLET BY MOUTH DAILY. INDICATIONS: TREATMENT TO PREVENT ACUTE GOUT ATTACK 90 tablet 2    naphazoline-pheniramine (OPCON-A) 0.027-0.315 % SOLN 1 drop to bilateral eyes bid 1 each 2    fenofibrate (TRIGLIDE) 160 MG tablet Take 1 tablet by mouth every evening 90 tablet 1    Handicap Placard MISC by Does not apply route 1 each 0    HYDROcodone-acetaminophen (NORCO) 5-325 MG per tablet Take by mouth every 4 hours as needed. No current facility-administered medications for this visit. Current Problem List:   Patient Active Problem List   Diagnosis    Essential (primary) hypertension    Mixed hyperlipidemia    Snoring    BMI 29.0-29.9,adult    Gastroesophageal reflux disease without esophagitis    Knee effusion, right    External hemorrhoids without complication    Idiopathic chronic gout, right knee, without tophus (tophi)    Hypertriglyceridemia    Impaired fasting glucose       Social History:   Social History     Tobacco Use    Smoking status: Former     Packs/day: 0.10     Types: Cigarettes     Quit date: 2/5/2012     Years since quitting: 10.7    Smokeless tobacco: Never   Substance Use Topics    Alcohol use: Yes       Family History:   Family History   Problem Relation Age of Onset    Gout Father     Sleep Apnea Other     Hypertension Mother        Surgical History:History reviewed.  No pertinent surgical history. ROS  Review of Systems   Constitutional:  Negative for fever. Eyes:  Negative for visual disturbance. Respiratory:  Negative for cough and shortness of breath. Cardiovascular:  Negative for chest pain. Gastrointestinal:  Negative for blood in stool. Musculoskeletal:  Negative for myalgias. Skin:  Positive for rash (along the ankle). Neurological:  Negative for syncope and weakness. Psychiatric/Behavioral:  Negative for dysphoric mood. /82   Pulse 88   Temp 98.5 °F (36.9 °C)   Ht 5' 5\" (1.651 m)   Wt 153 lb 8 oz (69.6 kg)   SpO2 97%   BMI 25.54 kg/m²   Body mass index is 25.54 kg/m². Physical Exam    Physical Exam  Vitals and nursing note reviewed. Constitutional:       Appearance: Normal appearance. Eyes:      Conjunctiva/sclera:      Right eye: Right conjunctiva is injected. Left eye: Left conjunctiva is injected. Comments: No jaundice   Cardiovascular:      Rate and Rhythm: Normal rate and regular rhythm. Pulmonary:      Effort: Pulmonary effort is normal.      Breath sounds: Normal breath sounds. Musculoskeletal:      Cervical back: Neck supple. Right lower leg: No edema. Left lower leg: No edema. Skin:     Findings: Rash present. Rash is macular and papular. Comments: Maculo-Papular rash along the ankle   Neurological:      Mental Status: He is alert. Psychiatric:         Mood and Affect: Mood normal.        I have reviewed the patient's past medical history, social history and family history and vitals. We have discussed treatment plan and follow up and given patient instructions. Patient's questions are answered and we will follow up as indicated. Return in about 4 months (around 3/14/2023) for fasting labs 1week prior and chronic follow up. Juanita Reed PA-C  10:07 AM  11/14/2022  Fasting glucose, knee effusion, chronic GERD, morbid obesity

## 2023-03-22 RX ORDER — FLUOCINOLONE ACETONIDE 0.11 MG/ML
OIL TOPICAL
Qty: 118.28 ML | Refills: 0 | Status: SHIPPED | OUTPATIENT
Start: 2023-03-22

## 2023-03-27 ENCOUNTER — NURSE ONLY (OUTPATIENT)
Dept: FAMILY MEDICINE CLINIC | Facility: CLINIC | Age: 53
End: 2023-03-27

## 2023-03-27 DIAGNOSIS — R74.01 TRANSAMINITIS: ICD-10-CM

## 2023-03-27 DIAGNOSIS — M1A.0610 IDIOPATHIC CHRONIC GOUT, RIGHT KNEE, WITHOUT TOPHUS (TOPHI): ICD-10-CM

## 2023-03-27 DIAGNOSIS — I10 ESSENTIAL (PRIMARY) HYPERTENSION: ICD-10-CM

## 2023-03-27 DIAGNOSIS — F10.10 ALCOHOL ABUSE: ICD-10-CM

## 2023-03-27 DIAGNOSIS — E78.2 MIXED HYPERLIPIDEMIA: ICD-10-CM

## 2023-03-27 LAB
ALBUMIN SERPL-MCNC: 3.6 G/DL (ref 3.5–5)
ALBUMIN/GLOB SERPL: 1.1 (ref 0.4–1.6)
ALP SERPL-CCNC: 76 U/L (ref 50–136)
ALT SERPL-CCNC: 81 U/L (ref 12–65)
ANION GAP SERPL CALC-SCNC: 4 MMOL/L (ref 2–11)
AST SERPL-CCNC: 39 U/L (ref 15–37)
BILIRUB SERPL-MCNC: 0.7 MG/DL (ref 0.2–1.1)
BUN SERPL-MCNC: 18 MG/DL (ref 6–23)
CALCIUM SERPL-MCNC: 8.4 MG/DL (ref 8.3–10.4)
CHLORIDE SERPL-SCNC: 111 MMOL/L (ref 101–110)
CHOLEST SERPL-MCNC: 278 MG/DL
CO2 SERPL-SCNC: 25 MMOL/L (ref 21–32)
CREAT SERPL-MCNC: 0.9 MG/DL (ref 0.8–1.5)
GLOBULIN SER CALC-MCNC: 3.3 G/DL (ref 2.8–4.5)
GLUCOSE SERPL-MCNC: 130 MG/DL (ref 65–100)
HAV IGM SER QL: NONREACTIVE
HBV CORE IGM SER QL: NONREACTIVE
HBV SURFACE AG SER QL: NONREACTIVE
HCV AB SER QL: NONREACTIVE
HDLC SERPL-MCNC: 80 MG/DL (ref 40–60)
HDLC SERPL: 3.5
LDLC SERPL CALC-MCNC: ABNORMAL MG/DL
LDLC SERPL DIRECT ASSAY-MCNC: 138 MG/DL
POTASSIUM SERPL-SCNC: 3.7 MMOL/L (ref 3.5–5.1)
PROT SERPL-MCNC: 6.9 G/DL (ref 6.3–8.2)
SODIUM SERPL-SCNC: 140 MMOL/L (ref 133–143)
TRIGL SERPL-MCNC: 577 MG/DL (ref 35–150)
URATE SERPL-MCNC: 10 MG/DL (ref 2.6–6)
VLDLC SERPL CALC-MCNC: 115.4 MG/DL (ref 6–23)

## 2023-04-17 ENCOUNTER — OFFICE VISIT (OUTPATIENT)
Dept: FAMILY MEDICINE CLINIC | Facility: CLINIC | Age: 53
End: 2023-04-17
Payer: COMMERCIAL

## 2023-04-17 VITALS
HEART RATE: 83 BPM | DIASTOLIC BLOOD PRESSURE: 92 MMHG | HEIGHT: 65 IN | TEMPERATURE: 98 F | SYSTOLIC BLOOD PRESSURE: 144 MMHG | BODY MASS INDEX: 26.19 KG/M2 | OXYGEN SATURATION: 97 % | WEIGHT: 157.2 LBS

## 2023-04-17 DIAGNOSIS — E78.2 MIXED HYPERLIPIDEMIA: ICD-10-CM

## 2023-04-17 DIAGNOSIS — R74.8 ELEVATED LIVER ENZYMES: ICD-10-CM

## 2023-04-17 DIAGNOSIS — F10.10 ALCOHOL ABUSE: ICD-10-CM

## 2023-04-17 DIAGNOSIS — R73.09 ELEVATED GLUCOSE: Primary | ICD-10-CM

## 2023-04-17 DIAGNOSIS — I10 PRIMARY HYPERTENSION: ICD-10-CM

## 2023-04-17 LAB — HBA1C MFR BLD: 5.6 %

## 2023-04-17 PROCEDURE — 83036 HEMOGLOBIN GLYCOSYLATED A1C: CPT | Performed by: PHYSICIAN ASSISTANT

## 2023-04-17 PROCEDURE — 3078F DIAST BP <80 MM HG: CPT | Performed by: PHYSICIAN ASSISTANT

## 2023-04-17 PROCEDURE — 3074F SYST BP LT 130 MM HG: CPT | Performed by: PHYSICIAN ASSISTANT

## 2023-04-17 PROCEDURE — 99214 OFFICE O/P EST MOD 30 MIN: CPT | Performed by: PHYSICIAN ASSISTANT

## 2023-04-17 RX ORDER — VALSARTAN 160 MG/1
160 TABLET ORAL DAILY
Qty: 30 TABLET | Refills: 5 | Status: SHIPPED | OUTPATIENT
Start: 2023-04-17

## 2023-04-17 SDOH — ECONOMIC STABILITY: HOUSING INSECURITY
IN THE LAST 12 MONTHS, WAS THERE A TIME WHEN YOU DID NOT HAVE A STEADY PLACE TO SLEEP OR SLEPT IN A SHELTER (INCLUDING NOW)?: NO

## 2023-04-17 SDOH — ECONOMIC STABILITY: FOOD INSECURITY: WITHIN THE PAST 12 MONTHS, YOU WORRIED THAT YOUR FOOD WOULD RUN OUT BEFORE YOU GOT MONEY TO BUY MORE.: NEVER TRUE

## 2023-04-17 SDOH — ECONOMIC STABILITY: INCOME INSECURITY: HOW HARD IS IT FOR YOU TO PAY FOR THE VERY BASICS LIKE FOOD, HOUSING, MEDICAL CARE, AND HEATING?: NOT HARD AT ALL

## 2023-04-17 SDOH — ECONOMIC STABILITY: FOOD INSECURITY: WITHIN THE PAST 12 MONTHS, THE FOOD YOU BOUGHT JUST DIDN'T LAST AND YOU DIDN'T HAVE MONEY TO GET MORE.: NEVER TRUE

## 2023-04-17 ASSESSMENT — PATIENT HEALTH QUESTIONNAIRE - PHQ9
SUM OF ALL RESPONSES TO PHQ QUESTIONS 1-9: 0
1. LITTLE INTEREST OR PLEASURE IN DOING THINGS: 0
SUM OF ALL RESPONSES TO PHQ9 QUESTIONS 1 & 2: 0
SUM OF ALL RESPONSES TO PHQ QUESTIONS 1-9: 0
SUM OF ALL RESPONSES TO PHQ QUESTIONS 1-9: 0
2. FEELING DOWN, DEPRESSED OR HOPELESS: 0
SUM OF ALL RESPONSES TO PHQ QUESTIONS 1-9: 0

## 2023-04-17 NOTE — PROGRESS NOTES
Negative for cough and shortness of breath. Cardiovascular:  Negative for chest pain. Gastrointestinal:  Negative for blood in stool. Musculoskeletal:  Negative for myalgias. Skin:  Negative for rash. Neurological:  Negative for syncope and weakness. Psychiatric/Behavioral:  Negative for dysphoric mood. BP (!) 144/92   Pulse 83   Temp 98 °F (36.7 °C)   Ht 5' 5\" (1.651 m)   Wt 157 lb 3.2 oz (71.3 kg)   SpO2 97%   BMI 26.16 kg/m²   Body mass index is 26.16 kg/m². Physical Exam    Physical Exam  Vitals and nursing note reviewed. Constitutional:       Appearance: Normal appearance. Eyes:      Conjunctiva/sclera: Conjunctivae normal.   Cardiovascular:      Rate and Rhythm: Normal rate and regular rhythm. Pulmonary:      Effort: Pulmonary effort is normal.      Breath sounds: Normal breath sounds. Musculoskeletal:      Cervical back: Neck supple. Right lower leg: No edema. Left lower leg: No edema. Neurological:      Mental Status: He is alert. Psychiatric:         Mood and Affect: Mood normal.        I have reviewed the patient's past medical history, social history and family history and vitals. We have discussed treatment plan and follow up and given patient instructions. Patient's questions are answered and we will follow up as indicated. Return in about 3 months (around 7/17/2023) for fasting labs 1week prior and chronic follow up. Juanita Parker PA-C  3:41 PM  4/19/2023

## 2023-04-19 ASSESSMENT — ENCOUNTER SYMPTOMS
BLOOD IN STOOL: 0
SHORTNESS OF BREATH: 0
COUGH: 0

## 2023-09-13 ENCOUNTER — TELEPHONE (OUTPATIENT)
Dept: FAMILY MEDICINE CLINIC | Facility: CLINIC | Age: 53
End: 2023-09-13

## 2023-09-13 DIAGNOSIS — M1A.0610 IDIOPATHIC CHRONIC GOUT, RIGHT KNEE, WITHOUT TOPHUS (TOPHI): ICD-10-CM

## 2023-09-13 RX ORDER — COLCHICINE 0.6 MG/1
TABLET ORAL
Qty: 90 TABLET | Refills: 2 | Status: SHIPPED | OUTPATIENT
Start: 2023-09-13

## 2023-09-13 NOTE — TELEPHONE ENCOUNTER
Pt needs the following refill:    Colchicine 0.6 mg tablet    Please use [facility] pharmacy on file    Thank you

## 2023-09-13 NOTE — TELEPHONE ENCOUNTER
I sent the requested medication/product in.  Please let patient know to check with their pharmacy  Thanks  Padmini GODFREY

## 2023-09-18 DIAGNOSIS — I10 PRIMARY HYPERTENSION: ICD-10-CM

## 2023-09-18 RX ORDER — VALSARTAN 160 MG/1
160 TABLET ORAL DAILY
Qty: 90 TABLET | Refills: 1 | Status: SHIPPED | OUTPATIENT
Start: 2023-09-18

## 2023-11-20 ENCOUNTER — OFFICE VISIT (OUTPATIENT)
Dept: FAMILY MEDICINE CLINIC | Facility: CLINIC | Age: 53
End: 2023-11-20
Payer: COMMERCIAL

## 2023-11-20 VITALS
DIASTOLIC BLOOD PRESSURE: 108 MMHG | HEIGHT: 65 IN | TEMPERATURE: 98 F | BODY MASS INDEX: 26.36 KG/M2 | OXYGEN SATURATION: 97 % | WEIGHT: 158.2 LBS | HEART RATE: 74 BPM | SYSTOLIC BLOOD PRESSURE: 160 MMHG

## 2023-11-20 DIAGNOSIS — R73.09 ELEVATED GLUCOSE: ICD-10-CM

## 2023-11-20 DIAGNOSIS — I10 PRIMARY HYPERTENSION: Primary | ICD-10-CM

## 2023-11-20 DIAGNOSIS — R74.8 ELEVATED LIVER ENZYMES: ICD-10-CM

## 2023-11-20 DIAGNOSIS — M10.471 ACUTE GOUT DUE TO OTHER SECONDARY CAUSE INVOLVING TOE OF RIGHT FOOT: ICD-10-CM

## 2023-11-20 DIAGNOSIS — M79.671 RIGHT FOOT PAIN: ICD-10-CM

## 2023-11-20 DIAGNOSIS — E78.2 MIXED HYPERLIPIDEMIA: ICD-10-CM

## 2023-11-20 PROCEDURE — 3077F SYST BP >= 140 MM HG: CPT | Performed by: PHYSICIAN ASSISTANT

## 2023-11-20 PROCEDURE — 99214 OFFICE O/P EST MOD 30 MIN: CPT | Performed by: PHYSICIAN ASSISTANT

## 2023-11-20 PROCEDURE — 3080F DIAST BP >= 90 MM HG: CPT | Performed by: PHYSICIAN ASSISTANT

## 2023-11-20 RX ORDER — ALLOPURINOL 100 MG/1
TABLET ORAL
Qty: 270 TABLET | Refills: 1 | Status: SHIPPED | OUTPATIENT
Start: 2023-11-20

## 2023-11-20 RX ORDER — FENOFIBRATE 160 MG/1
160 TABLET ORAL EVERY EVENING
Qty: 90 TABLET | Refills: 1 | Status: SHIPPED | OUTPATIENT
Start: 2023-11-20

## 2023-11-20 RX ORDER — PREDNISONE 10 MG/1
TABLET ORAL
Qty: 12 TABLET | Refills: 0 | Status: SHIPPED | OUTPATIENT
Start: 2023-11-20

## 2023-11-20 RX ORDER — INDOMETHACIN 50 MG/1
50 CAPSULE ORAL 3 TIMES DAILY PRN
Qty: 30 CAPSULE | Refills: 3 | Status: SHIPPED | OUTPATIENT
Start: 2023-11-20

## 2023-11-20 RX ORDER — OMEPRAZOLE 40 MG/1
40 CAPSULE, DELAYED RELEASE ORAL
Qty: 30 CAPSULE | Refills: 0 | Status: SHIPPED | OUTPATIENT
Start: 2023-11-20

## 2023-11-20 RX ORDER — VALSARTAN 320 MG/1
320 TABLET ORAL DAILY
Qty: 90 TABLET | Refills: 1 | Status: SHIPPED | OUTPATIENT
Start: 2023-11-20

## 2023-11-20 ASSESSMENT — PATIENT HEALTH QUESTIONNAIRE - PHQ9
SUM OF ALL RESPONSES TO PHQ QUESTIONS 1-9: 0
SUM OF ALL RESPONSES TO PHQ QUESTIONS 1-9: 0
1. LITTLE INTEREST OR PLEASURE IN DOING THINGS: 0
2. FEELING DOWN, DEPRESSED OR HOPELESS: 0
SUM OF ALL RESPONSES TO PHQ QUESTIONS 1-9: 0
SUM OF ALL RESPONSES TO PHQ9 QUESTIONS 1 & 2: 0
SUM OF ALL RESPONSES TO PHQ QUESTIONS 1-9: 0

## 2023-11-20 ASSESSMENT — ENCOUNTER SYMPTOMS
BLOOD IN STOOL: 0
SHORTNESS OF BREATH: 0
COUGH: 0

## 2023-11-21 LAB
25(OH)D3 SERPL-MCNC: 22.6 NG/ML (ref 30–100)
ALBUMIN SERPL-MCNC: 3.5 G/DL (ref 3.5–5)
ALBUMIN/GLOB SERPL: 0.9 (ref 0.4–1.6)
ALP SERPL-CCNC: 62 U/L (ref 50–136)
ALT SERPL-CCNC: 120 U/L (ref 12–65)
ANION GAP SERPL CALC-SCNC: 8 MMOL/L (ref 2–11)
AST SERPL-CCNC: 66 U/L (ref 15–37)
BILIRUB SERPL-MCNC: 0.4 MG/DL (ref 0.2–1.1)
BUN SERPL-MCNC: 17 MG/DL (ref 6–23)
CALCIUM SERPL-MCNC: 9.3 MG/DL (ref 8.3–10.4)
CHLORIDE SERPL-SCNC: 109 MMOL/L (ref 101–110)
CHOLEST SERPL-MCNC: 238 MG/DL
CO2 SERPL-SCNC: 25 MMOL/L (ref 21–32)
CREAT SERPL-MCNC: 1 MG/DL (ref 0.8–1.5)
EST. AVERAGE GLUCOSE BLD GHB EST-MCNC: 123 MG/DL
GLOBULIN SER CALC-MCNC: 3.9 G/DL (ref 2.8–4.5)
GLUCOSE SERPL-MCNC: 95 MG/DL (ref 65–100)
HBA1C MFR BLD: 5.9 % (ref 4.8–5.6)
HDLC SERPL-MCNC: 105 MG/DL (ref 40–60)
HDLC SERPL: 2.3
LDLC SERPL CALC-MCNC: 80.8 MG/DL
POTASSIUM SERPL-SCNC: 4.1 MMOL/L (ref 3.5–5.1)
PROT SERPL-MCNC: 7.4 G/DL (ref 6.3–8.2)
SODIUM SERPL-SCNC: 142 MMOL/L (ref 133–143)
TRIGL SERPL-MCNC: 261 MG/DL (ref 35–150)
URATE SERPL-MCNC: 9.2 MG/DL (ref 2.6–6)
VLDLC SERPL CALC-MCNC: 52.2 MG/DL (ref 6–23)

## 2023-12-04 ENCOUNTER — NURSE ONLY (OUTPATIENT)
Dept: FAMILY MEDICINE CLINIC | Facility: CLINIC | Age: 53
End: 2023-12-04

## 2023-12-04 VITALS — BODY MASS INDEX: 26.33 KG/M2 | SYSTOLIC BLOOD PRESSURE: 138 MMHG | HEIGHT: 65 IN | DIASTOLIC BLOOD PRESSURE: 72 MMHG

## 2023-12-04 DIAGNOSIS — I10 ESSENTIAL (PRIMARY) HYPERTENSION: Primary | ICD-10-CM

## 2023-12-11 ENCOUNTER — OFFICE VISIT (OUTPATIENT)
Dept: FAMILY MEDICINE CLINIC | Facility: CLINIC | Age: 53
End: 2023-12-11
Payer: COMMERCIAL

## 2023-12-11 VITALS
WEIGHT: 155 LBS | OXYGEN SATURATION: 96 % | TEMPERATURE: 98.1 F | HEART RATE: 84 BPM | BODY MASS INDEX: 25.83 KG/M2 | SYSTOLIC BLOOD PRESSURE: 142 MMHG | DIASTOLIC BLOOD PRESSURE: 84 MMHG | HEIGHT: 65 IN

## 2023-12-11 DIAGNOSIS — I10 PRIMARY HYPERTENSION: ICD-10-CM

## 2023-12-11 DIAGNOSIS — H60.501 ACUTE OTITIS EXTERNA OF RIGHT EAR, UNSPECIFIED TYPE: Primary | ICD-10-CM

## 2023-12-11 DIAGNOSIS — H66.011 NON-RECURRENT ACUTE SUPPURATIVE OTITIS MEDIA OF RIGHT EAR WITH SPONTANEOUS RUPTURE OF TYMPANIC MEMBRANE: ICD-10-CM

## 2023-12-11 PROCEDURE — 99214 OFFICE O/P EST MOD 30 MIN: CPT | Performed by: PHYSICIAN ASSISTANT

## 2023-12-11 PROCEDURE — 3079F DIAST BP 80-89 MM HG: CPT | Performed by: PHYSICIAN ASSISTANT

## 2023-12-11 PROCEDURE — 3077F SYST BP >= 140 MM HG: CPT | Performed by: PHYSICIAN ASSISTANT

## 2023-12-11 RX ORDER — AMLODIPINE BESYLATE 5 MG/1
5 TABLET ORAL NIGHTLY
Qty: 90 TABLET | Refills: 1 | Status: SHIPPED | OUTPATIENT
Start: 2023-12-11

## 2023-12-11 RX ORDER — ALLOPURINOL 100 MG/1
TABLET ORAL
Qty: 270 TABLET | Refills: 1 | Status: SHIPPED | OUTPATIENT
Start: 2023-12-11

## 2023-12-11 RX ORDER — PREDNISONE 10 MG/1
TABLET ORAL
Qty: 12 TABLET | Refills: 0 | Status: SHIPPED | OUTPATIENT
Start: 2023-12-11

## 2023-12-11 RX ORDER — AMOXICILLIN AND CLAVULANATE POTASSIUM 875; 125 MG/1; MG/1
1 TABLET, FILM COATED ORAL 2 TIMES DAILY
Qty: 20 TABLET | Refills: 0 | Status: SHIPPED | OUTPATIENT
Start: 2023-12-11 | End: 2023-12-21

## 2023-12-11 ASSESSMENT — PATIENT HEALTH QUESTIONNAIRE - PHQ9
SUM OF ALL RESPONSES TO PHQ QUESTIONS 1-9: 0
2. FEELING DOWN, DEPRESSED OR HOPELESS: 0
SUM OF ALL RESPONSES TO PHQ QUESTIONS 1-9: 0
SUM OF ALL RESPONSES TO PHQ QUESTIONS 1-9: 0
1. LITTLE INTEREST OR PLEASURE IN DOING THINGS: 0
SUM OF ALL RESPONSES TO PHQ QUESTIONS 1-9: 0
SUM OF ALL RESPONSES TO PHQ9 QUESTIONS 1 & 2: 0

## 2023-12-11 ASSESSMENT — ENCOUNTER SYMPTOMS: COUGH: 0

## 2023-12-13 ENCOUNTER — OFFICE VISIT (OUTPATIENT)
Dept: ENT CLINIC | Age: 53
End: 2023-12-13
Payer: COMMERCIAL

## 2023-12-13 VITALS
DIASTOLIC BLOOD PRESSURE: 82 MMHG | BODY MASS INDEX: 26.16 KG/M2 | HEIGHT: 65 IN | SYSTOLIC BLOOD PRESSURE: 134 MMHG | WEIGHT: 157 LBS

## 2023-12-13 DIAGNOSIS — H69.91 ETD (EUSTACHIAN TUBE DYSFUNCTION), RIGHT: Primary | ICD-10-CM

## 2023-12-13 DIAGNOSIS — H72.91 PERFORATION OF RIGHT TYMPANIC MEMBRANE: ICD-10-CM

## 2023-12-13 PROCEDURE — 99204 OFFICE O/P NEW MOD 45 MIN: CPT | Performed by: STUDENT IN AN ORGANIZED HEALTH CARE EDUCATION/TRAINING PROGRAM

## 2023-12-13 PROCEDURE — 92567 TYMPANOMETRY: CPT | Performed by: STUDENT IN AN ORGANIZED HEALTH CARE EDUCATION/TRAINING PROGRAM

## 2023-12-13 PROCEDURE — 3075F SYST BP GE 130 - 139MM HG: CPT | Performed by: STUDENT IN AN ORGANIZED HEALTH CARE EDUCATION/TRAINING PROGRAM

## 2023-12-13 PROCEDURE — 3079F DIAST BP 80-89 MM HG: CPT | Performed by: STUDENT IN AN ORGANIZED HEALTH CARE EDUCATION/TRAINING PROGRAM

## 2023-12-13 ASSESSMENT — ENCOUNTER SYMPTOMS
EYE ITCHING: 0
SHORTNESS OF BREATH: 0
VOMITING: 0
EYE REDNESS: 0

## 2024-01-08 ENCOUNTER — OFFICE VISIT (OUTPATIENT)
Dept: FAMILY MEDICINE CLINIC | Facility: CLINIC | Age: 54
End: 2024-01-08
Payer: COMMERCIAL

## 2024-01-08 VITALS
DIASTOLIC BLOOD PRESSURE: 84 MMHG | SYSTOLIC BLOOD PRESSURE: 132 MMHG | OXYGEN SATURATION: 98 % | WEIGHT: 155.8 LBS | HEART RATE: 80 BPM | BODY MASS INDEX: 25.96 KG/M2 | HEIGHT: 65 IN | TEMPERATURE: 99.2 F

## 2024-01-08 DIAGNOSIS — I10 PRIMARY HYPERTENSION: ICD-10-CM

## 2024-01-08 DIAGNOSIS — H72.91 PERFORATION OF RIGHT TYMPANIC MEMBRANE: Primary | ICD-10-CM

## 2024-01-08 PROCEDURE — 3075F SYST BP GE 130 - 139MM HG: CPT | Performed by: PHYSICIAN ASSISTANT

## 2024-01-08 PROCEDURE — 3079F DIAST BP 80-89 MM HG: CPT | Performed by: PHYSICIAN ASSISTANT

## 2024-01-08 PROCEDURE — 99214 OFFICE O/P EST MOD 30 MIN: CPT | Performed by: PHYSICIAN ASSISTANT

## 2024-01-08 ASSESSMENT — PATIENT HEALTH QUESTIONNAIRE - PHQ9
SUM OF ALL RESPONSES TO PHQ QUESTIONS 1-9: 0
2. FEELING DOWN, DEPRESSED OR HOPELESS: 0
SUM OF ALL RESPONSES TO PHQ QUESTIONS 1-9: 0
1. LITTLE INTEREST OR PLEASURE IN DOING THINGS: 0
SUM OF ALL RESPONSES TO PHQ9 QUESTIONS 1 & 2: 0

## 2024-01-08 NOTE — PROGRESS NOTES
[facility]  [address] [place] [address]  Phone: [phone]  Fax [phone]  Provider : Juanita Calles PA-C      Patient: Pavel Edmond  YOB: 1970  Patient Age 53 y.o.  Patient sex: male  Medical Record:  [id number]  Visit Date:1/8/2024   Author:  Juanita Calles PA-C    Family Practice Clinic Note     Chief complaint  Pavel Edmond  is a 53 y.o. male who was seen on 01/08/24  9:55 AM  for the following reasons:    Chief Complaint   Patient presents with    Follow-up     BP and  R ear pain       Current Medical problems addressed  Htn - been taking meds and no side effects,  no chest pain nor shortness of breath.  BP improved today    Ear - he saw ent and confirmed TM rupture and still hears popping but no pain or discomfort. And he was instructed to keep it dry       ASSESSMENT AND PLAN    ICD-10-CM    1. Perforation of right tympanic membrane  H72.91       2. Primary hypertension  I10          Pavel was seen today for follow-up.    Diagnoses and all orders for this visit:    Perforation of right tympanic membrane    Primary hypertension      Plan includes the following:    No follow-up provider specified.  No orders of the defined types were placed in this encounter.  Bp improved and now to goal --Continue valsartin and norvasc to manage bp and return if any symptoms develop  Recheck in 6 months    Past Medical History:  Allergies:No Known Allergies    Current Medications:   Medications marked \"taking\" at this time:  Current Outpatient Medications   Medication Sig Dispense Refill    allopurinol (ZYLOPRIM) 100 MG tablet 2 tablets po daily x 10 days then 3po daily 270 tablet 1    amLODIPine (NORVASC) 5 MG tablet Take 1 tablet by mouth at bedtime For blood pressure 90 tablet 1    omeprazole (PRILOSEC) 40 MG delayed release capsule Take 1 capsule by mouth every morning (before breakfast) 30 capsule 0    fenofibrate (TRIGLIDE) 160 MG tablet Take 1 tablet by mouth every evening 90 tablet 1

## 2024-03-25 ENCOUNTER — OFFICE VISIT (OUTPATIENT)
Dept: FAMILY MEDICINE CLINIC | Facility: CLINIC | Age: 54
End: 2024-03-25
Payer: COMMERCIAL

## 2024-03-25 VITALS
HEART RATE: 82 BPM | RESPIRATION RATE: 16 BRPM | DIASTOLIC BLOOD PRESSURE: 84 MMHG | OXYGEN SATURATION: 97 % | WEIGHT: 158 LBS | SYSTOLIC BLOOD PRESSURE: 136 MMHG | HEIGHT: 65 IN | BODY MASS INDEX: 26.33 KG/M2 | TEMPERATURE: 97.6 F

## 2024-03-25 DIAGNOSIS — K92.1: Primary | ICD-10-CM

## 2024-03-25 DIAGNOSIS — K43.9 VENTRAL HERNIA WITHOUT OBSTRUCTION OR GANGRENE: ICD-10-CM

## 2024-03-25 DIAGNOSIS — R74.8 ELEVATED LIVER ENZYMES: ICD-10-CM

## 2024-03-25 LAB
ALBUMIN SERPL-MCNC: 3.8 G/DL (ref 3.5–5)
ALBUMIN/GLOB SERPL: 0.9 (ref 0.4–1.6)
ALP SERPL-CCNC: 60 U/L (ref 50–136)
ALT SERPL-CCNC: 53 U/L (ref 12–65)
ANION GAP SERPL CALC-SCNC: 4 MMOL/L (ref 2–11)
AST SERPL-CCNC: 30 U/L (ref 15–37)
BASOPHILS # BLD: 0.1 K/UL (ref 0–0.2)
BASOPHILS NFR BLD: 1 % (ref 0–2)
BILIRUB SERPL-MCNC: 0.3 MG/DL (ref 0.2–1.1)
BUN SERPL-MCNC: 14 MG/DL (ref 6–23)
CALCIUM SERPL-MCNC: 9.6 MG/DL (ref 8.3–10.4)
CHLORIDE SERPL-SCNC: 106 MMOL/L (ref 103–113)
CO2 SERPL-SCNC: 28 MMOL/L (ref 21–32)
CREAT SERPL-MCNC: 1 MG/DL (ref 0.8–1.5)
DIFFERENTIAL METHOD BLD: ABNORMAL
EOSINOPHIL # BLD: 0.2 K/UL (ref 0–0.8)
EOSINOPHIL NFR BLD: 2 % (ref 0.5–7.8)
ERYTHROCYTE [DISTWIDTH] IN BLOOD BY AUTOMATED COUNT: 12.1 % (ref 11.9–14.6)
GLOBULIN SER CALC-MCNC: 4.4 G/DL (ref 2.8–4.5)
GLUCOSE SERPL-MCNC: 93 MG/DL (ref 65–100)
HCT VFR BLD AUTO: 48.6 % (ref 41.1–50.3)
HGB BLD-MCNC: 16.6 G/DL (ref 13.6–17.2)
IMM GRANULOCYTES # BLD AUTO: 0 K/UL (ref 0–0.5)
IMM GRANULOCYTES NFR BLD AUTO: 0 % (ref 0–5)
LYMPHOCYTES # BLD: 2.7 K/UL (ref 0.5–4.6)
LYMPHOCYTES NFR BLD: 36 % (ref 13–44)
MCH RBC QN AUTO: 31.6 PG (ref 26.1–32.9)
MCHC RBC AUTO-ENTMCNC: 34.2 G/DL (ref 31.4–35)
MCV RBC AUTO: 92.4 FL (ref 82–102)
MONOCYTES # BLD: 1.2 K/UL (ref 0.1–1.3)
MONOCYTES NFR BLD: 15 % (ref 4–12)
NEUTS SEG # BLD: 3.5 K/UL (ref 1.7–8.2)
NEUTS SEG NFR BLD: 46 % (ref 43–78)
NRBC # BLD: 0 K/UL (ref 0–0.2)
PLATELET # BLD AUTO: 215 K/UL (ref 150–450)
PMV BLD AUTO: 9.5 FL (ref 9.4–12.3)
POTASSIUM SERPL-SCNC: 4.1 MMOL/L (ref 3.5–5.1)
PROT SERPL-MCNC: 8.2 G/DL (ref 6.3–8.2)
RBC # BLD AUTO: 5.26 M/UL (ref 4.23–5.6)
SODIUM SERPL-SCNC: 138 MMOL/L (ref 136–146)
WBC # BLD AUTO: 7.6 K/UL (ref 4.3–11.1)

## 2024-03-25 PROCEDURE — 3079F DIAST BP 80-89 MM HG: CPT

## 2024-03-25 PROCEDURE — 99213 OFFICE O/P EST LOW 20 MIN: CPT

## 2024-03-25 PROCEDURE — 3075F SYST BP GE 130 - 139MM HG: CPT

## 2024-03-25 ASSESSMENT — ENCOUNTER SYMPTOMS
EYES NEGATIVE: 1
ALLERGIC/IMMUNOLOGIC NEGATIVE: 1
BLOOD IN STOOL: 1
RESPIRATORY NEGATIVE: 1

## 2024-03-25 NOTE — PROGRESS NOTES
.......    Subjective:     Pavel Edmond 1970 is a 53 y.o. male Established patient, here for evaluation of the following:   Chief Complaint   Patient presents with    Other     2 weeks ago he saw bright red blood in his stool       Patient presents to the clinic for 1 day of bright red blood in stool 2 weeks ago, he has not had it happen again since then. He states that he continues to drink 10 beers per day. He has been counseled on several occasions that he needs to stop drinking, liver enzymes are elevated, he denies abdominal pain or coughing up blood. He denies vomiting, he denies issues with urination.  He did have cologuard in  and it was negative.         Past Medical History:   Diagnosis Date    Excessive daytime sleepiness 12/15/2016    Hemorrhoid     Hypercholesterolemia     Hypertension     Morbid obesity (HCC)     Snoring 12/15/2016     History reviewed. No pertinent surgical history.   Family History   Problem Relation Age of Onset    Gout Father     Sleep Apnea Other     Hypertension Mother      Social History     Tobacco Use    Smoking status: Former     Current packs/day: 0.00     Types: Cigarettes     Quit date: 2012     Years since quittin.1    Smokeless tobacco: Never   Substance Use Topics    Alcohol use: Yes      Current Outpatient Medications   Medication Sig Dispense Refill    allopurinol (ZYLOPRIM) 100 MG tablet 2 tablets po daily x 10 days then 3po daily 270 tablet 1    amLODIPine (NORVASC) 5 MG tablet Take 1 tablet by mouth at bedtime For blood pressure 90 tablet 1    omeprazole (PRILOSEC) 40 MG delayed release capsule Take 1 capsule by mouth every morning (before breakfast) 30 capsule 0    fenofibrate (TRIGLIDE) 160 MG tablet Take 1 tablet by mouth every evening 90 tablet 1    indomethacin (INDOCIN) 50 MG capsule Take 1 capsule by mouth 3 times daily as needed for Pain 30 capsule 3    valsartan (DIOVAN) 320 MG tablet Take 1 tablet by mouth daily 90 tablet 1    colchicine

## 2024-04-04 ENCOUNTER — TELEPHONE (OUTPATIENT)
Dept: FAMILY MEDICINE CLINIC | Facility: CLINIC | Age: 54
End: 2024-04-04

## 2024-04-04 RX ORDER — INDOMETHACIN 50 MG/1
50 CAPSULE ORAL 3 TIMES DAILY PRN
Qty: 30 CAPSULE | Refills: 3 | Status: SHIPPED | OUTPATIENT
Start: 2024-04-04

## 2024-04-04 NOTE — TELEPHONE ENCOUNTER
I sent the requested medication/product in. Please let patient know to check with their pharmacy  Thanks  Juanita GODFREY

## 2024-05-07 ENCOUNTER — TELEPHONE (OUTPATIENT)
Dept: FAMILY MEDICINE CLINIC | Facility: CLINIC | Age: 54
End: 2024-05-07

## 2024-05-07 RX ORDER — OMEPRAZOLE 40 MG/1
40 CAPSULE, DELAYED RELEASE ORAL
Qty: 30 CAPSULE | Refills: 0 | Status: SHIPPED | OUTPATIENT
Start: 2024-05-07

## 2024-05-30 RX ORDER — OMEPRAZOLE 40 MG/1
40 CAPSULE, DELAYED RELEASE ORAL
Qty: 90 CAPSULE | Refills: 1 | Status: SHIPPED | OUTPATIENT
Start: 2024-05-30

## 2024-06-11 ENCOUNTER — TELEPHONE (OUTPATIENT)
Dept: GASTROENTEROLOGY | Age: 54
End: 2024-06-11

## 2024-06-11 ENCOUNTER — PREP FOR PROCEDURE (OUTPATIENT)
Dept: GASTROENTEROLOGY | Age: 54
End: 2024-06-11

## 2024-06-11 ENCOUNTER — OFFICE VISIT (OUTPATIENT)
Age: 54
End: 2024-06-11
Payer: COMMERCIAL

## 2024-06-11 VITALS
HEART RATE: 81 BPM | OXYGEN SATURATION: 98 % | RESPIRATION RATE: 17 BRPM | HEIGHT: 65 IN | DIASTOLIC BLOOD PRESSURE: 92 MMHG | SYSTOLIC BLOOD PRESSURE: 119 MMHG | BODY MASS INDEX: 26.16 KG/M2 | WEIGHT: 157 LBS

## 2024-06-11 DIAGNOSIS — K62.5 BRBPR (BRIGHT RED BLOOD PER RECTUM): Primary | ICD-10-CM

## 2024-06-11 DIAGNOSIS — K62.5 BRBPR (BRIGHT RED BLOOD PER RECTUM): ICD-10-CM

## 2024-06-11 DIAGNOSIS — F10.20 ALCOHOL DEPENDENCE, DAILY USE (HCC): ICD-10-CM

## 2024-06-11 DIAGNOSIS — Z12.11 ENCOUNTER FOR SCREENING COLONOSCOPY: Primary | ICD-10-CM

## 2024-06-11 PROCEDURE — 3074F SYST BP LT 130 MM HG: CPT | Performed by: PHYSICIAN ASSISTANT

## 2024-06-11 PROCEDURE — 99203 OFFICE O/P NEW LOW 30 MIN: CPT | Performed by: PHYSICIAN ASSISTANT

## 2024-06-11 PROCEDURE — 3080F DIAST BP >= 90 MM HG: CPT | Performed by: PHYSICIAN ASSISTANT

## 2024-06-11 RX ORDER — SODIUM, POTASSIUM,MAG SULFATES 17.5-3.13G
1 SOLUTION, RECONSTITUTED, ORAL ORAL ONCE
Qty: 1 EACH | Refills: 0 | Status: SHIPPED | OUTPATIENT
Start: 2024-06-11 | End: 2024-06-11

## 2024-06-11 RX ORDER — SODIUM CHLORIDE 0.9 % (FLUSH) 0.9 %
5-40 SYRINGE (ML) INJECTION PRN
Status: CANCELLED | OUTPATIENT
Start: 2024-06-11

## 2024-06-11 RX ORDER — SODIUM CHLORIDE 9 MG/ML
25 INJECTION, SOLUTION INTRAVENOUS PRN
Status: CANCELLED | OUTPATIENT
Start: 2024-06-11

## 2024-06-11 RX ORDER — SODIUM CHLORIDE 0.9 % (FLUSH) 0.9 %
5-40 SYRINGE (ML) INJECTION EVERY 12 HOURS SCHEDULED
Status: CANCELLED | OUTPATIENT
Start: 2024-06-11

## 2024-06-11 NOTE — PROGRESS NOTES
Pavel Edmond (:  1970) is a 53 y.o. male new patient referred to our office for evaluation of the following chief complaint(s):  New Patient (Blood in stool/)        Assessment & Plan   ASSESSMENT/PLAN:  1. BRBPR (bright red blood per rectum)  2. Alcohol dependence, daily use (HCC)    -He reports a 3-4 day period of BRBPR which resolved when he stopped drinking alcohol x 2 weeks. He since returned to daily drinking without any recurrent bleeding. No other symptoms or bowel habit changes. Denies hx hemorrhoids has never had a colonoscopy; will schedule.  -Also discussed consideration for surveillance upper endoscopy in patients of  descent considered high risk for gastric cancer.  He has no new symptoms and declines upper endoscopy.    Subjective   SUBJECTIVE/OBJECTIVE  Pavel Edmond is a 53 y.o. year old male with PMH pertinent for HTN, HPL, alcohol abuse.  He denies abdominal surgical history.     Patient was referred to our office for evaluation of rectal bleeding.  At OV 3/25 patient reported 1 day of BRBPR with no recurrent episodes.  Noted to drink 10 beers daily with elevated liver enzymes. Repeat LFTs 3/25 were WNL. Cologuard test negative .  No prior GI or endoscopic evaluation discovered on chart review.    Today patient reports a single period of rectal bleeding about two months ago. He describes 4-5 episodes of large volume BRBPR with wiping and in the toilet bowl. When he discontinued alcohol use x 2 weeks the bleeding stopped. He went back to drinking but has not had any recurrent bleeding. Denies melena, hemorrhoids, abdominal pain, cramping, fever, chills, nausea, vomiting, acid reflux, indigestion, dysphagia, odynophagia, bowel habit changes, constipation, diarrhea.     He quit tobacco 15-20 years ago. Denies illicit substance use. Drinks 6 cans bud lights daily. On weekends he drinks > 10 bud lights. Denies prior EGD or colonoscopy. Patient denies family hx of GI malignancy or IBD. He moved to

## 2024-06-11 NOTE — TELEPHONE ENCOUNTER
SUPREP Bowel Preparation - Split Dosing  If you are scheduled at our                                          If you are scheduled at our                                 [facility] [place]:      [facility] [place]:    [address], [facility] [place] [address]       [place] [address]  [phone]       Items to purchase 5 days before your procedure:    Suprep -  prescription at your pharmacy.  Purchase one 10oz bottle of Magnesium Citrate  Purchase Dulcolax Laxative tablets (Not stool softener tablets).      Two days before your procedure:      Drink at least eight glasses of water today.  Stop eating high- fiber foods such as vegetables and beans until after your colonoscopy. You can eat all other types of food today.     Drink the 10 oz bottle of magnesium citrate after dinner.      The day before your Colonoscopy:    Clear liquids only the entire day (water, Gatorade, coffee, tea, Sprite, 7-up, Jell-O, popsicles, chicken / beef broth, apple juice).    No milk / No dairy products, NO RED, BLUE or PURPLE color liquids /dyes    4:00 pm Take 2 Dulcolax tablets.     6:00 pm - Pour one 6 oz bottle of Suprep liquid into the mixing container. Add cold water to the 16-oz line and mix. Drink all the solution over 10-15 minutes.   Drink two more 16-ounce glasses of water over the next hour.            The day of your procedure:    6 hours prior to arrival time of your procedure, pour one 6 oz bottle of Suprep liquid into the mixing container. Add cold water to the 16-oz line and mix. Drink all the solution over 10-15 minutes. Drink two more 16-ounce glasses of water. Must finish drinking the final glass of water at least 4 hours prior to arrival time.    NOTHING TO EAT UNTIL AFTER YOUR PROCEDURE.    NOTHING TO DRINK 4 HOURS PRIOR TO COLONOSCOPY.    IMPORTANT:      If you do not follow these

## 2024-07-05 ENCOUNTER — TELEPHONE (OUTPATIENT)
Dept: FAMILY MEDICINE CLINIC | Facility: CLINIC | Age: 54
End: 2024-07-05

## 2024-07-05 DIAGNOSIS — M1A.0610 IDIOPATHIC CHRONIC GOUT, RIGHT KNEE, WITHOUT TOPHUS (TOPHI): ICD-10-CM

## 2024-07-05 RX ORDER — COLCHICINE 0.6 MG/1
TABLET ORAL
Qty: 90 TABLET | Refills: 2 | Status: SHIPPED | OUTPATIENT
Start: 2024-07-05

## 2024-07-05 NOTE — TELEPHONE ENCOUNTER
This is a Juanita Calles patient needing a refill:    Colchicine (colcrys) 0.6 mg tablet  Take 1 talbet by mouth daily    Please use the [facility] pharmacy on file.    Thank you

## 2024-07-12 ENCOUNTER — TELEPHONE (OUTPATIENT)
Dept: GASTROENTEROLOGY | Age: 54
End: 2024-07-12

## 2024-08-23 ENCOUNTER — TELEPHONE (OUTPATIENT)
Dept: FAMILY MEDICINE CLINIC | Facility: CLINIC | Age: 54
End: 2024-08-23

## 2024-08-23 NOTE — TELEPHONE ENCOUNTER
----- Message from Salvador MI sent at 8/23/2024  9:35 AM EDT -----  Regarding: ECC Escalation To Practice  ECC Escalation To Practice      Type of Escalation: Red Flag Symptom  --------------------------------------------------------------------------------------------------------------------------    Information for Provider:  Patient is looking for appointment for: Symptom Ear Bleeding  Reasons for Message: Unable to reach practice     Additional Information patient called in to schedule appt abt his ear that is bleeding lately  --------------------------------------------------------------------------------------------------------------------------    Relationship to Patient: Self     Call Back Info: OK to leave message on voicemail  Preferred Call Back Number: Phone  [phone]

## 2025-01-27 ENCOUNTER — TELEPHONE (OUTPATIENT)
Dept: FAMILY MEDICINE CLINIC | Facility: CLINIC | Age: 55
End: 2025-01-27

## 2025-01-27 NOTE — TELEPHONE ENCOUNTER
Patient called requesting a refill for this medication.     colchicine (COLCRYS) 0.6 MG tablet    [facility]/pharmacy #[id number] - [place] - [address] - P [phone] - F [phone]      Thank you.

## 2025-01-28 ENCOUNTER — TELEPHONE (OUTPATIENT)
Dept: PRIMARY CARE CLINIC | Facility: CLINIC | Age: 55
End: 2025-01-28

## 2025-01-28 ENCOUNTER — LAB (OUTPATIENT)
Dept: FAMILY MEDICINE CLINIC | Facility: CLINIC | Age: 55
End: 2025-01-28

## 2025-01-28 DIAGNOSIS — R73.09 ELEVATED GLUCOSE: ICD-10-CM

## 2025-01-28 DIAGNOSIS — E78.2 MIXED HYPERLIPIDEMIA: ICD-10-CM

## 2025-01-28 DIAGNOSIS — I10 ESSENTIAL (PRIMARY) HYPERTENSION: ICD-10-CM

## 2025-01-28 DIAGNOSIS — M1A.0610 IDIOPATHIC CHRONIC GOUT, RIGHT KNEE, WITHOUT TOPHUS (TOPHI): ICD-10-CM

## 2025-01-28 DIAGNOSIS — R73.09 ELEVATED GLUCOSE: Primary | ICD-10-CM

## 2025-01-28 LAB
ALBUMIN SERPL-MCNC: 3.7 G/DL (ref 3.5–5)
ALBUMIN/GLOB SERPL: 1.1 (ref 1–1.9)
ALP SERPL-CCNC: 65 U/L (ref 40–129)
ALT SERPL-CCNC: 38 U/L (ref 8–55)
ANION GAP SERPL CALC-SCNC: 13 MMOL/L (ref 7–16)
AST SERPL-CCNC: 25 U/L (ref 15–37)
BILIRUB SERPL-MCNC: 0.4 MG/DL (ref 0–1.2)
BUN SERPL-MCNC: 22 MG/DL (ref 6–23)
CALCIUM SERPL-MCNC: 9.7 MG/DL (ref 8.8–10.2)
CHLORIDE SERPL-SCNC: 100 MMOL/L (ref 98–107)
CHOLEST SERPL-MCNC: 238 MG/DL (ref 0–200)
CO2 SERPL-SCNC: 26 MMOL/L (ref 20–29)
CREAT SERPL-MCNC: 0.95 MG/DL (ref 0.8–1.3)
EST. AVERAGE GLUCOSE BLD GHB EST-MCNC: 131 MG/DL
GLOBULIN SER CALC-MCNC: 3.5 G/DL (ref 2.3–3.5)
GLUCOSE SERPL-MCNC: 97 MG/DL (ref 70–99)
HBA1C MFR BLD: 6.2 % (ref 0–5.6)
HDLC SERPL-MCNC: 125 MG/DL (ref 40–60)
HDLC SERPL: 1.9 (ref 0–5)
LDLC SERPL CALC-MCNC: 53 MG/DL (ref 0–100)
POTASSIUM SERPL-SCNC: 3.9 MMOL/L (ref 3.5–5.1)
PROT SERPL-MCNC: 7.2 G/DL (ref 6.3–8.2)
SODIUM SERPL-SCNC: 139 MMOL/L (ref 136–145)
TRIGL SERPL-MCNC: 300 MG/DL (ref 0–150)
URATE SERPL-MCNC: 10.6 MG/DL (ref 3.9–8.2)
VLDLC SERPL CALC-MCNC: 60 MG/DL (ref 6–23)

## 2025-01-28 RX ORDER — COLCHICINE 0.6 MG/1
TABLET ORAL
Qty: 90 TABLET | Refills: 2 | Status: SHIPPED | OUTPATIENT
Start: 2025-01-28

## 2025-01-28 NOTE — TELEPHONE ENCOUNTER
Patient called requesting a refill for this medication.      colchicine (COLCRYS) 0.6 MG tablet     [facility]/pharmacy #[id number] - [place] - [address] - P [phone] - F [phone]        Thank you.

## 2025-02-14 RX ORDER — ERGOCALCIFEROL 1.25 MG/1
50000 CAPSULE, LIQUID FILLED ORAL WEEKLY
Qty: 12 CAPSULE | Refills: 1 | Status: SHIPPED | OUTPATIENT
Start: 2025-02-14

## 2025-03-12 ENCOUNTER — TELEPHONE (OUTPATIENT)
Dept: FAMILY MEDICINE CLINIC | Facility: CLINIC | Age: 55
End: 2025-03-12

## 2025-03-12 NOTE — TELEPHONE ENCOUNTER
Pt needs the following refill:    Amlodiipine (Norvasc) 5 mg tablet  Take 1 tablet by mouth at bedtime    Please use the [facility] pharmacy on file.    Thank you

## 2025-03-13 ENCOUNTER — TELEPHONE (OUTPATIENT)
Dept: FAMILY MEDICINE CLINIC | Facility: CLINIC | Age: 55
End: 2025-03-13

## 2025-03-13 DIAGNOSIS — I10 PRIMARY HYPERTENSION: ICD-10-CM

## 2025-03-13 RX ORDER — VALSARTAN 320 MG/1
320 TABLET ORAL DAILY
Qty: 90 TABLET | Refills: 0 | Status: SHIPPED | OUTPATIENT
Start: 2025-03-13

## 2025-03-13 RX ORDER — AMLODIPINE BESYLATE 5 MG/1
5 TABLET ORAL NIGHTLY
Qty: 90 TABLET | Refills: 0 | Status: CANCELLED | OUTPATIENT
Start: 2025-03-13

## 2025-03-13 NOTE — TELEPHONE ENCOUNTER
Pt called requesting a refill for the following medication:     valsartan (DIOVAN) 320 MG tablet    Please send it to pharmacy on file.

## 2025-03-13 NOTE — TELEPHONE ENCOUNTER
Given 90 days but is due for follow up please help him schedule a follow up as its been over a year since a recheck

## 2025-04-07 ENCOUNTER — OFFICE VISIT (OUTPATIENT)
Dept: FAMILY MEDICINE CLINIC | Facility: CLINIC | Age: 55
End: 2025-04-07
Payer: COMMERCIAL

## 2025-04-07 VITALS
DIASTOLIC BLOOD PRESSURE: 88 MMHG | HEIGHT: 65 IN | HEART RATE: 80 BPM | WEIGHT: 162.2 LBS | SYSTOLIC BLOOD PRESSURE: 140 MMHG | TEMPERATURE: 98 F | OXYGEN SATURATION: 98 % | BODY MASS INDEX: 27.02 KG/M2

## 2025-04-07 DIAGNOSIS — E78.2 MIXED HYPERLIPIDEMIA: ICD-10-CM

## 2025-04-07 DIAGNOSIS — F10.20 ALCOHOL DEPENDENCE, DAILY USE (HCC): ICD-10-CM

## 2025-04-07 DIAGNOSIS — E55.9 VITAMIN D DEFICIENCY: ICD-10-CM

## 2025-04-07 DIAGNOSIS — K21.9 GASTROESOPHAGEAL REFLUX DISEASE WITHOUT ESOPHAGITIS: ICD-10-CM

## 2025-04-07 DIAGNOSIS — I10 PRIMARY HYPERTENSION: ICD-10-CM

## 2025-04-07 DIAGNOSIS — R73.09 ELEVATED GLUCOSE: ICD-10-CM

## 2025-04-07 DIAGNOSIS — M10.471 ACUTE GOUT DUE TO OTHER SECONDARY CAUSE INVOLVING TOE OF RIGHT FOOT: ICD-10-CM

## 2025-04-07 DIAGNOSIS — R74.8 ELEVATED LIVER ENZYMES: ICD-10-CM

## 2025-04-07 DIAGNOSIS — I10 ESSENTIAL (PRIMARY) HYPERTENSION: Primary | ICD-10-CM

## 2025-04-07 LAB
25(OH)D3 SERPL-MCNC: 25.1 NG/ML (ref 30–100)
ALBUMIN SERPL-MCNC: 4.2 G/DL (ref 3.5–5)
ALBUMIN/GLOB SERPL: 1.2 (ref 1–1.9)
ALP SERPL-CCNC: 73 U/L (ref 40–129)
ALT SERPL-CCNC: 56 U/L (ref 8–55)
ANION GAP SERPL CALC-SCNC: 10 MMOL/L (ref 7–16)
AST SERPL-CCNC: 35 U/L (ref 15–37)
BILIRUB SERPL-MCNC: 0.6 MG/DL (ref 0–1.2)
BUN SERPL-MCNC: 16 MG/DL (ref 6–23)
CALCIUM SERPL-MCNC: 10 MG/DL (ref 8.8–10.2)
CHLORIDE SERPL-SCNC: 101 MMOL/L (ref 98–107)
CHOLEST SERPL-MCNC: 242 MG/DL (ref 0–200)
CO2 SERPL-SCNC: 26 MMOL/L (ref 20–29)
CREAT SERPL-MCNC: 0.8 MG/DL (ref 0.8–1.3)
CREAT UR-MCNC: 58 MG/DL (ref 39–259)
EST. AVERAGE GLUCOSE BLD GHB EST-MCNC: 126 MG/DL
GLOBULIN SER CALC-MCNC: 3.6 G/DL (ref 2.3–3.5)
GLUCOSE SERPL-MCNC: 90 MG/DL (ref 70–99)
HBA1C MFR BLD: 6 % (ref 0–5.6)
HDLC SERPL-MCNC: 82 MG/DL (ref 40–60)
HDLC SERPL: 3 (ref 0–5)
LDLC SERPL CALC-MCNC: ABNORMAL MG/DL (ref 0–100)
LDLC SERPL DIRECT ASSAY-MCNC: 120 MG/DL (ref 0–100)
MICROALBUMIN UR-MCNC: 1.86 MG/DL (ref 0–20)
MICROALBUMIN/CREAT UR-RTO: 32 MG/G (ref 0–30)
POTASSIUM SERPL-SCNC: 4.6 MMOL/L (ref 3.5–5.1)
PROT SERPL-MCNC: 7.8 G/DL (ref 6.3–8.2)
SODIUM SERPL-SCNC: 137 MMOL/L (ref 136–145)
TRIGL SERPL-MCNC: 560 MG/DL (ref 0–150)
URATE SERPL-MCNC: 11.4 MG/DL (ref 3.9–8.2)
VLDLC SERPL CALC-MCNC: 112 MG/DL (ref 6–23)

## 2025-04-07 PROCEDURE — 99214 OFFICE O/P EST MOD 30 MIN: CPT | Performed by: PHYSICIAN ASSISTANT

## 2025-04-07 PROCEDURE — 3077F SYST BP >= 140 MM HG: CPT | Performed by: PHYSICIAN ASSISTANT

## 2025-04-07 PROCEDURE — 3079F DIAST BP 80-89 MM HG: CPT | Performed by: PHYSICIAN ASSISTANT

## 2025-04-07 RX ORDER — OMEPRAZOLE 40 MG/1
40 CAPSULE, DELAYED RELEASE ORAL
Qty: 90 CAPSULE | Refills: 1 | Status: SHIPPED | OUTPATIENT
Start: 2025-04-07

## 2025-04-07 RX ORDER — FENOFIBRATE 160 MG/1
160 TABLET ORAL EVERY EVENING
Qty: 90 TABLET | Refills: 1 | Status: SHIPPED | OUTPATIENT
Start: 2025-04-07

## 2025-04-07 RX ORDER — VALSARTAN 320 MG/1
320 TABLET ORAL DAILY
Qty: 90 TABLET | Refills: 1 | Status: SHIPPED | OUTPATIENT
Start: 2025-04-07

## 2025-04-07 RX ORDER — ERGOCALCIFEROL 1.25 MG/1
50000 CAPSULE, LIQUID FILLED ORAL WEEKLY
Qty: 12 CAPSULE | Refills: 1 | Status: SHIPPED | OUTPATIENT
Start: 2025-04-07

## 2025-04-07 RX ORDER — INDOMETHACIN 50 MG/1
50 CAPSULE ORAL 3 TIMES DAILY PRN
Qty: 30 CAPSULE | Refills: 3 | Status: SHIPPED | OUTPATIENT
Start: 2025-04-07

## 2025-04-07 RX ORDER — ALLOPURINOL 100 MG/1
TABLET ORAL
Qty: 270 TABLET | Refills: 1 | Status: SHIPPED | OUTPATIENT
Start: 2025-04-07 | End: 2025-04-16 | Stop reason: ALTCHOICE

## 2025-04-07 RX ORDER — AMLODIPINE BESYLATE 5 MG/1
5 TABLET ORAL NIGHTLY
Qty: 90 TABLET | Refills: 1 | Status: SHIPPED | OUTPATIENT
Start: 2025-04-07

## 2025-04-07 ASSESSMENT — PATIENT HEALTH QUESTIONNAIRE - PHQ9
SUM OF ALL RESPONSES TO PHQ QUESTIONS 1-9: 0
1. LITTLE INTEREST OR PLEASURE IN DOING THINGS: NOT AT ALL
2. FEELING DOWN, DEPRESSED OR HOPELESS: NOT AT ALL
SUM OF ALL RESPONSES TO PHQ QUESTIONS 1-9: 0

## 2025-04-07 NOTE — PROGRESS NOTES
[facility]  [address] [place] [address]  Phone: [phone]  Fax [phone]  Provider : Juanita Calles PA-C      Patient: Pavel Edmond  YOB: 1970  Patient Age 54 y.o.  Patient sex: male  Medical Record:  [id number]  Visit Date:4/7/2025   Author:  Juanita Calles PA-C    Family Practice Clinic Note     Chief complaint  Pavel Edmond  is a 54 y.o. male who was seen on 04/16/25  1:57 PM  for the following reasons:    Chief Complaint   Patient presents with    Follow-up    Knee Pain     Right--x 3 months       Current Medical problems addressed    Gout - flaired on right knee pain about 2 weeks ago and then resolved.  He did not increase allopurinol as instructed last visit and only been taking 100mg   Patient has hyperlipidemia and has been currently taking fenofibrate 160.  He is not exercising as recommended he is also still drinking typically 6-10 beers a night but reports he is cut out liquor and has dropped his alcohol from 12 down to 6-10 I advised dropping it down to 2 at the most or stopping it to help reduce the risk for any liver failure or worsening gout flares.    hypertension-- patient is currently on valsartan 320 and amlodipine 5 mg and notes insurance increased cost of the valsartan.  I recommended he check with his insurance to see what is on the formulary for alternatives that we could switch it to that would be more cost effective.    Vitamin D deficiency he ran out of vitamin D and has not been taking it.    Omeprazole 40 mg was taken for reflux and reports it is keeping his symptoms well-controlled.    He has also had some elevated glucose in the past and will recheck with labs today.     ASSESSMENT AND PLAN    ICD-10-CM    1. Essential (primary) hypertension  I10 amLODIPine (NORVASC) 5 MG tablet     valsartan (DIOVAN) 320 MG tablet      2. Primary hypertension  I10 valsartan (DIOVAN) 320 MG tablet      3. Alcohol dependence, daily use (HCC)  F10.20       4. Elevated

## 2025-04-16 ENCOUNTER — RESULTS FOLLOW-UP (OUTPATIENT)
Dept: FAMILY MEDICINE CLINIC | Facility: CLINIC | Age: 55
End: 2025-04-16

## 2025-04-16 RX ORDER — ALLOPURINOL 300 MG/1
300 TABLET ORAL DAILY
Qty: 30 TABLET | Refills: 3 | Status: SHIPPED | OUTPATIENT
Start: 2025-04-16

## 2025-04-16 RX ORDER — ROSUVASTATIN CALCIUM 10 MG/1
10 TABLET, COATED ORAL NIGHTLY
Qty: 30 TABLET | Refills: 3 | Status: SHIPPED | OUTPATIENT
Start: 2025-04-16

## 2025-04-16 ASSESSMENT — ENCOUNTER SYMPTOMS
SHORTNESS OF BREATH: 0
COUGH: 0
BLOOD IN STOOL: 0